# Patient Record
Sex: FEMALE | Race: BLACK OR AFRICAN AMERICAN | NOT HISPANIC OR LATINO | Employment: FULL TIME | URBAN - METROPOLITAN AREA
[De-identification: names, ages, dates, MRNs, and addresses within clinical notes are randomized per-mention and may not be internally consistent; named-entity substitution may affect disease eponyms.]

---

## 2019-02-21 ENCOUNTER — OFFICE VISIT (OUTPATIENT)
Dept: HEMATOLOGY/ONCOLOGY | Facility: CLINIC | Age: 34
End: 2019-02-21
Payer: MEDICAID

## 2019-02-21 VITALS
OXYGEN SATURATION: 98 % | HEART RATE: 85 BPM | SYSTOLIC BLOOD PRESSURE: 121 MMHG | TEMPERATURE: 98 F | HEIGHT: 64 IN | RESPIRATION RATE: 18 BRPM | DIASTOLIC BLOOD PRESSURE: 75 MMHG | WEIGHT: 133.81 LBS | BODY MASS INDEX: 22.85 KG/M2

## 2019-02-21 DIAGNOSIS — Z17.0 MALIGNANT NEOPLASM OF UPPER-OUTER QUADRANT OF LEFT BREAST IN FEMALE, ESTROGEN RECEPTOR POSITIVE: Primary | ICD-10-CM

## 2019-02-21 DIAGNOSIS — C50.412 MALIGNANT NEOPLASM OF UPPER-OUTER QUADRANT OF LEFT BREAST IN FEMALE, ESTROGEN RECEPTOR POSITIVE: Primary | ICD-10-CM

## 2019-02-21 PROBLEM — Z17.1 MALIGNANT NEOPLASM OF UPPER-OUTER QUADRANT OF LEFT BREAST IN FEMALE, ESTROGEN RECEPTOR NEGATIVE: Status: ACTIVE | Noted: 2017-10-26

## 2019-02-21 PROCEDURE — 99213 OFFICE O/P EST LOW 20 MIN: CPT | Mod: S$GLB,,, | Performed by: NURSE PRACTITIONER

## 2019-02-21 PROCEDURE — 99213 PR OFFICE/OUTPT VISIT, EST, LEVL III, 20-29 MIN: ICD-10-PCS | Mod: S$GLB,,, | Performed by: NURSE PRACTITIONER

## 2019-02-21 RX ORDER — NERATINIB 40 MG/1
1 TABLET ORAL DAILY
COMMUNITY
Start: 2018-12-31 | End: 2019-08-06 | Stop reason: SDDI

## 2019-02-21 RX ORDER — TAMOXIFEN CITRATE 20 MG/1
1 TABLET ORAL DAILY
COMMUNITY
Start: 2019-01-08 | End: 2019-12-09 | Stop reason: ALTCHOICE

## 2019-02-21 NOTE — PROGRESS NOTES
Subjective:      Patient ID: Cindy Escobar is a 33 y.o. female.    Oncology History:     Malignant neoplasm of upper-outer quadrant of left breast in female, estrogen receptor positive    10/26/2017 Pathology Significant Finding     Left Breast Bx Invasive Ductal Carcinoma, ER 84%, MN 1.8%, Her2 +   Left Axillary LNBx + met carcinoma          11/13/2017 Genetic Testing     Patient has genetic testing done for Triple Positive Breast Cancer.                                              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         12/14/2017 - 4/18/2018 Chemotherapy     TCHP x 6          7/11/2018 - 12/8/2018 Chemotherapy     Herceptin/Perjeta  q 3 weeks          8/16/2018 -  Hormone Therapy     Tamoxifen          12/13/2018 Imaging Significant Findings     Brett Mammo Cat 2         12/13/2018 Imaging Significant Findings     CT C/A/P:  JAKE          1/3/2019 -  Hormone Therapy     Nerlynx              Chief Complaint: Breast Cancer (pt reports feeling new lump in left breast, lower part)    Cindy Escobar is here for to followup on current antihormonal therapy, tamoxifen and nerlynx. Pt complaint with meds, denies any hot flashes, mood swings, diarrhea or joint aches at this time.  Pt noticed left breast tenderness over weekend. On PE no mass palpable but area of tenderness with cord-like abnormality noted. Pt states breast swelling comes and goes.   Past Medical History:   Diagnosis Date    Anxiety     Breast cancer     Other sleep disorders      Family History   Problem Relation Age of Onset    Hypertension Mother     Hypertension Father     Hypertension Maternal Grandmother      Social History     Socioeconomic History    Marital status:      Spouse name: Not on file    Number of children: Not on file    Years of education: Not on file    Highest education level: Not on file   Social Needs    Financial resource strain: Not on file    Food insecurity -  worry: Not on file    Food insecurity - inability: Not on file    Transportation needs - medical: Not on file    Transportation needs - non-medical: Not on file   Occupational History    Not on file   Tobacco Use    Smoking status: Former Smoker    Smokeless tobacco: Never Used   Substance and Sexual Activity    Alcohol use: No     Frequency: Never    Drug use: No    Sexual activity: Not on file   Other Topics Concern    Not on file   Social History Narrative    Not on file     Past Surgical History:   Procedure Laterality Date    CHOLECYSTECTOMY  2006    PORTACATH PLACEMENT  12/01/2017           Review of systems:  Review of Systems   Constitutional: Negative for activity change, appetite change, chills, diaphoresis, fatigue, fever and unexpected weight change.   HENT: Negative for congestion, dental problem, mouth sores, nosebleeds, sore throat, tinnitus and voice change.    Eyes: Negative for photophobia, discharge and visual disturbance.   Respiratory: Negative for apnea, cough, choking, chest tightness, shortness of breath, wheezing and stridor.    Cardiovascular: Negative for chest pain, palpitations and leg swelling.   Gastrointestinal: Negative for abdominal distention, abdominal pain, anal bleeding, blood in stool, constipation, diarrhea, nausea, rectal pain and vomiting.   Endocrine: Negative for cold intolerance and heat intolerance.   Genitourinary: Negative for difficulty urinating, dysuria, hematuria, menstrual problem, vaginal bleeding, vaginal discharge and vaginal pain.   Musculoskeletal: Negative for arthralgias, back pain, gait problem, joint swelling and myalgias.   Skin: Negative for color change, pallor, rash and wound.   Neurological: Negative for dizziness, syncope, light-headedness and numbness.   Hematological: Negative for adenopathy. Does not bruise/bleed easily.   Psychiatric/Behavioral: Negative for agitation, confusion, sleep disturbance and suicidal ideas. The patient is  not nervous/anxious.        Objective:     Physical Exam   Constitutional: She is oriented to person, place, and time. She appears well-developed and well-nourished.   Neck: Normal range of motion.   Cardiovascular: Normal rate, regular rhythm and normal heart sounds.   Pulmonary/Chest: Effort normal and breath sounds normal. Left breast exhibits tenderness. Left breast exhibits no inverted nipple, no mass, no nipple discharge and no skin change.       Abdominal: Soft. Bowel sounds are normal.   Musculoskeletal: Normal range of motion.   Neurological: She is alert and oriented to person, place, and time.   Psychiatric: She has a normal mood and affect.     Vitals:    02/21/19 0837   BP: 121/75   Pulse: 85   Resp: 18   Temp: 97.8 °F (36.6 °C)   Body surface area is 1.66 meters squared.    Labs:  CBC reviewed     Assessment:      1. Malignant neoplasm of upper-outer quadrant of left breast in female, estrogen receptor positive           Plan:   Malignant neoplasm of upper-outer quadrant of left breast in female, estrogen receptor positive    Other orders  -     US Breast Left Complete; Future; Expected date: 02/21/2019    RTC 1 week with left breast u/s       ALTAGRACIA Hernandez

## 2019-02-26 ENCOUNTER — TELEPHONE (OUTPATIENT)
Dept: HEMATOLOGY/ONCOLOGY | Facility: CLINIC | Age: 34
End: 2019-02-26

## 2019-02-26 NOTE — TELEPHONE ENCOUNTER
Called pt with results of left breast u/s done on 2/22/19 showing a dense area of breast tissue without internal doppler flow near site of prior surgery, may represent resolving hematoma vs dense breast tissue. No mass present.  Rec 6 month repeat.   Will see pt in 1 month for eval

## 2019-03-21 ENCOUNTER — OFFICE VISIT (OUTPATIENT)
Dept: HEMATOLOGY/ONCOLOGY | Facility: CLINIC | Age: 34
End: 2019-03-21
Payer: MEDICAID

## 2019-03-21 VITALS
SYSTOLIC BLOOD PRESSURE: 122 MMHG | HEART RATE: 75 BPM | HEIGHT: 64 IN | BODY MASS INDEX: 22.57 KG/M2 | OXYGEN SATURATION: 98 % | WEIGHT: 132.19 LBS | TEMPERATURE: 97 F | RESPIRATION RATE: 18 BRPM | DIASTOLIC BLOOD PRESSURE: 77 MMHG

## 2019-03-21 DIAGNOSIS — C50.412 MALIGNANT NEOPLASM OF UPPER-OUTER QUADRANT OF LEFT BREAST IN FEMALE, ESTROGEN RECEPTOR POSITIVE: Primary | ICD-10-CM

## 2019-03-21 DIAGNOSIS — Z17.0 MALIGNANT NEOPLASM OF UPPER-OUTER QUADRANT OF LEFT BREAST IN FEMALE, ESTROGEN RECEPTOR POSITIVE: Primary | ICD-10-CM

## 2019-03-21 PROCEDURE — 99214 PR OFFICE/OUTPT VISIT, EST, LEVL IV, 30-39 MIN: ICD-10-PCS | Mod: S$GLB,,, | Performed by: NURSE PRACTITIONER

## 2019-03-21 PROCEDURE — 99214 OFFICE O/P EST MOD 30 MIN: CPT | Mod: S$GLB,,, | Performed by: NURSE PRACTITIONER

## 2019-03-21 RX ORDER — DIPHENOXYLATE HYDROCHLORIDE AND ATROPINE SULFATE 2.5; .025 MG/1; MG/1
1 TABLET ORAL 4 TIMES DAILY PRN
COMMUNITY
End: 2019-08-06 | Stop reason: SDDI

## 2019-03-21 NOTE — PROGRESS NOTES
"Subjective:      Patient ID: Cindy Escobar is a 33 y.o. female.    Oncology History:     Malignant neoplasm of upper-outer quadrant of left breast in female, estrogen receptor positive    10/26/2017 Cancer Staged     Cancer Staging  Malignant neoplasm of upper-outer quadrant of left breast in female, estrogen receptor positive  Staging form: Onc Breast AJCC V7  - Clinical stage from 10/26/2017: Stage IIB (T2, N1, M0) - Unsigned  - Pathologic stage from 5/21/2018: Stage IB (T1a, N1mi, cM0) - Unsigned         11/13/2017 Genetic Testing     Patient has genetic testing done for Triple Positive Breast Cancer.                                              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         12/14/2017 - 4/18/2018 Chemotherapy     TCHP x 6          7/11/2018 - 12/8/2018 Chemotherapy     Herceptin/Perjeta  q 3 weeks          8/16/2018 -  Hormone Therapy     Tamoxifen          12/13/2018 Imaging Significant Findings     Brett Mammo Cat 2         12/13/2018 Imaging Significant Findings     CT C/A/P:  JAKE          12/13/2018 Imaging Significant Findings     CT C/A/P JAKE          1/3/2019 -  Hormone Therapy     Nerlynx              Chief Complaint: Breast Cancer    Cindy Escobar is here for to followup on current antihormonal therapy, tamoxifen and nerlynx. Pt complaint with meds, denies any hot flashes, mood swings, or joint aches at this time.  Pt noticed left breast tenderness over weekend. On PE no mass palpable but area of tenderness with cord-like abnormality noted. Pt states breast swelling comes and goes.     Pt states some diarrhea and upset stomach, so "missed a few dose" this last month. Encouraged medication compliance and to take Lomotil as instructed.    Past Medical History:   Diagnosis Date    Anxiety     Breast cancer     Other sleep disorders      Family History   Problem Relation Age of Onset    Hypertension Mother     Hypertension Father     " Hypertension Maternal Grandmother      Social History     Socioeconomic History    Marital status:      Spouse name: Not on file    Number of children: Not on file    Years of education: Not on file    Highest education level: Not on file   Social Needs    Financial resource strain: Not on file    Food insecurity - worry: Not on file    Food insecurity - inability: Not on file    Transportation needs - medical: Not on file    Transportation needs - non-medical: Not on file   Occupational History    Not on file   Tobacco Use    Smoking status: Former Smoker    Smokeless tobacco: Never Used   Substance and Sexual Activity    Alcohol use: No     Frequency: Never    Drug use: No    Sexual activity: Not on file   Other Topics Concern    Not on file   Social History Narrative    Not on file     Past Surgical History:   Procedure Laterality Date    CHOLECYSTECTOMY  2006    PORTACATH PLACEMENT  12/01/2017           Review of systems:  Review of Systems   Constitutional: Negative for activity change, appetite change, chills, diaphoresis, fatigue, fever and unexpected weight change.   HENT: Negative for congestion, dental problem, mouth sores, nosebleeds, sore throat, tinnitus and voice change.    Eyes: Negative for photophobia, discharge and visual disturbance.   Respiratory: Negative for apnea, cough, choking, chest tightness, shortness of breath, wheezing and stridor.    Cardiovascular: Negative for chest pain, palpitations and leg swelling.   Gastrointestinal: Positive for diarrhea (pt reports having slacked off on Nerlynx due to diarrhea; educated on using Lomotil and importance of taking Nerlynx as prescribed). Negative for abdominal distention, abdominal pain, anal bleeding, blood in stool, constipation, nausea, rectal pain and vomiting.   Endocrine: Negative for cold intolerance and heat intolerance.   Genitourinary: Negative for difficulty urinating, dysuria, hematuria, menstrual problem,  vaginal bleeding, vaginal discharge and vaginal pain.   Musculoskeletal: Negative for arthralgias, back pain, gait problem, joint swelling and myalgias.   Skin: Negative for color change, pallor, rash and wound.   Neurological: Negative for dizziness, syncope, light-headedness and numbness.   Hematological: Negative for adenopathy. Does not bruise/bleed easily.   Psychiatric/Behavioral: Negative for agitation, confusion, sleep disturbance and suicidal ideas. The patient is not nervous/anxious.        Objective:     Physical Exam   Constitutional: She is oriented to person, place, and time. She appears well-developed and well-nourished.   Neck: Normal range of motion.   Cardiovascular: Normal rate, regular rhythm and normal heart sounds.   Pulmonary/Chest: Effort normal and breath sounds normal. Left breast exhibits tenderness. Left breast exhibits no inverted nipple, no mass, no nipple discharge and no skin change.       Abdominal: Soft. Bowel sounds are normal.   Musculoskeletal: Normal range of motion.   Neurological: She is alert and oriented to person, place, and time.   Psychiatric: She has a normal mood and affect.     Vitals:    03/21/19 0915   BP: 122/77   Pulse: 75   Resp: 18   Temp: 97.2 °F (36.2 °C)   Body surface area is 1.65 meters squared.    Labs:  CBC reviewed, PLT now in WNL. WBC 3.8, CMP WNL     Assessment:      1. Malignant neoplasm of upper-outer quadrant of left breast in female, estrogen receptor positive           Plan:   Malignant neoplasm of upper-outer quadrant of left breast in female, estrogen receptor positive  -     CBC w/ DIFF; Future; Expected date: 03/21/2019  -     CMP; Future; Expected date: 03/21/2019    Adv compliance with Nerlynx and Lomotil and to call if diarrhea persists.   Discussed with pt indications and over all benefit of nerlynx.        Monae Ag, ANP

## 2019-03-29 ENCOUNTER — TELEPHONE (OUTPATIENT)
Dept: HEMATOLOGY/ONCOLOGY | Facility: CLINIC | Age: 34
End: 2019-03-29

## 2019-03-29 DIAGNOSIS — N94.10 DYSPAREUNIA, FEMALE: Primary | ICD-10-CM

## 2019-03-29 NOTE — TELEPHONE ENCOUNTER
----- Message from Amparo Law sent at 3/28/2019  1:24 PM CDT -----  Contact: patient  States that she has a UTI.. [phone]..thinks she needs antibi

## 2019-06-21 ENCOUNTER — OFFICE VISIT (OUTPATIENT)
Dept: HEMATOLOGY/ONCOLOGY | Facility: CLINIC | Age: 34
End: 2019-06-21
Payer: MEDICAID

## 2019-06-21 VITALS
HEIGHT: 64 IN | WEIGHT: 138.19 LBS | TEMPERATURE: 99 F | DIASTOLIC BLOOD PRESSURE: 65 MMHG | SYSTOLIC BLOOD PRESSURE: 109 MMHG | HEART RATE: 69 BPM | OXYGEN SATURATION: 99 % | RESPIRATION RATE: 18 BRPM | BODY MASS INDEX: 23.59 KG/M2

## 2019-06-21 DIAGNOSIS — Z17.0 MALIGNANT NEOPLASM OF UPPER-OUTER QUADRANT OF LEFT BREAST IN FEMALE, ESTROGEN RECEPTOR POSITIVE: Primary | ICD-10-CM

## 2019-06-21 DIAGNOSIS — C50.412 MALIGNANT NEOPLASM OF UPPER-OUTER QUADRANT OF LEFT BREAST IN FEMALE, ESTROGEN RECEPTOR POSITIVE: Primary | ICD-10-CM

## 2019-06-21 PROCEDURE — 99214 PR OFFICE/OUTPT VISIT, EST, LEVL IV, 30-39 MIN: ICD-10-PCS | Mod: S$GLB,,, | Performed by: NURSE PRACTITIONER

## 2019-06-21 PROCEDURE — 99214 OFFICE O/P EST MOD 30 MIN: CPT | Mod: S$GLB,,, | Performed by: NURSE PRACTITIONER

## 2019-06-21 NOTE — PROGRESS NOTES
"Subjective:      Patient ID: Cindy Escobar is a 33 y.o. female.    Oncology History:     Malignant neoplasm of upper-outer quadrant of left breast in female, estrogen receptor positive    10/26/2017 Cancer Staged     Cancer Staging  Malignant neoplasm of upper-outer quadrant of left breast in female, estrogen receptor positive  Staging form: Onc Breast AJCC V7  - Clinical stage from 10/26/2017: Stage IIB (T2, N1, M0) - Unsigned  - Pathologic stage from 5/21/2018: Stage IB (T1a, N1mi, cM0) - Unsigned         11/13/2017 Genetic Testing     Patient has genetic testing done for Triple Positive Breast Cancer.                                              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         12/14/2017 - 4/18/2018 Chemotherapy     TCHP x 6          7/11/2018 - 12/8/2018 Chemotherapy     Herceptin/Perjeta  q 3 weeks          8/16/2018 -  Hormone Therapy     Tamoxifen          12/13/2018 Imaging Significant Findings     Brett Mammo Cat 2         12/13/2018 Imaging Significant Findings     CT C/A/P:  JAKE          12/13/2018 Imaging Significant Findings     CT C/A/P JAKE          1/3/2019 - 4/1/2019 Hormone Therapy     Nerlynx              Chief Complaint: Breast Cancer (reports having stopped taking nerlynx)    Cindy Escobar is here for to followup on current antihormonal therapy, tamoxifen and nerlynx. Pt complaint with meds, denies any hot flashes, mood swings, or joint aches at this time.  Pt noticed left breast tenderness over weekend. On PE no mass palpable but area of tenderness with cord-like abnormality noted. Pt states breast swelling comes and goes.     Pt states some diarrhea and upset stomach, so "missed a few dose" this last month. Encouraged medication compliance and to take Lomotil as instructed.    Today, she states she quit Nerlynx in April 2019 due to side effects of diarrhea and upset stomach. Patient is complaint with tamoxifen with no resumption of menstruation. She " is feeling good but does c/o left breast swelling and tenderness that comes and goes. Labs reviewed with patient. Repeat imaging to be ordered today for next visit.     Past Medical History:   Diagnosis Date    Anxiety     Breast cancer     Cancer     Other sleep disorders      Family History   Problem Relation Age of Onset    Hypertension Mother     Hypertension Father     Hypertension Maternal Grandmother      Social History     Socioeconomic History    Marital status:      Spouse name: Not on file    Number of children: Not on file    Years of education: Not on file    Highest education level: Not on file   Occupational History    Not on file   Social Needs    Financial resource strain: Not on file    Food insecurity:     Worry: Not on file     Inability: Not on file    Transportation needs:     Medical: Not on file     Non-medical: Not on file   Tobacco Use    Smoking status: Former Smoker    Smokeless tobacco: Never Used   Substance and Sexual Activity    Alcohol use: No     Frequency: Never    Drug use: No    Sexual activity: Not on file   Lifestyle    Physical activity:     Days per week: Not on file     Minutes per session: Not on file    Stress: Not on file   Relationships    Social connections:     Talks on phone: Not on file     Gets together: Not on file     Attends Jain service: Not on file     Active member of club or organization: Not on file     Attends meetings of clubs or organizations: Not on file     Relationship status: Not on file   Other Topics Concern    Not on file   Social History Narrative    Not on file     Past Surgical History:   Procedure Laterality Date    CHOLECYSTECTOMY  2006    PORTACATH PLACEMENT  12/01/2017           Review of systems:  Review of Systems   Constitutional: Negative for activity change, appetite change, chills, diaphoresis, fatigue, fever and unexpected weight change.   HENT: Negative for congestion, dental problem, mouth  sores, nosebleeds, sore throat, tinnitus and voice change.    Eyes: Negative for photophobia, discharge and visual disturbance.   Respiratory: Negative for apnea, cough, choking, chest tightness, shortness of breath, wheezing and stridor.    Cardiovascular: Negative for chest pain, palpitations and leg swelling.   Gastrointestinal: Positive for diarrhea (pt reports having slacked off on Nerlynx due to diarrhea; educated on using Lomotil and importance of taking Nerlynx as prescribed). Negative for abdominal distention, abdominal pain, anal bleeding, blood in stool, constipation, nausea, rectal pain and vomiting.   Endocrine: Negative for cold intolerance and heat intolerance.   Genitourinary: Negative for difficulty urinating, dysuria, hematuria, menstrual problem, vaginal bleeding, vaginal discharge and vaginal pain.   Musculoskeletal: Negative for arthralgias, back pain, gait problem, joint swelling and myalgias.   Skin: Negative for color change, pallor, rash and wound.   Neurological: Negative for dizziness, syncope, light-headedness and numbness.   Hematological: Negative for adenopathy. Does not bruise/bleed easily.   Psychiatric/Behavioral: Negative for agitation, confusion, sleep disturbance and suicidal ideas. The patient is not nervous/anxious.        Objective:     Physical Exam   Constitutional: She is oriented to person, place, and time. She appears well-developed and well-nourished.   Neck: Normal range of motion.   Cardiovascular: Normal rate, regular rhythm and normal heart sounds.   Pulmonary/Chest: Effort normal and breath sounds normal. Left breast exhibits tenderness. Left breast exhibits no inverted nipple, no mass, no nipple discharge and no skin change.       Abdominal: Soft. Bowel sounds are normal.   Musculoskeletal: Normal range of motion.   Neurological: She is alert and oriented to person, place, and time.   Psychiatric: She has a normal mood and affect.     Vitals:    06/21/19 0953   BP:  109/65   Pulse: 69   Resp: 18   Temp: 98.7 °F (37.1 °C)   Body surface area is 1.68 meters squared.    Labs:  6/19/19 WBC 2.9 HGB 12.6  CMP WNL      Assessment:      1. Malignant neoplasm of upper-outer quadrant of left breast in female, estrogen receptor positive           Plan:   Malignant neoplasm of upper-outer quadrant of left breast in female, estrogen receptor positive  -     CT Chest With Contrast; Future; Expected date: 06/21/2019  -     CT Abdomen Pelvis W Wo Contrast; Future; Expected date: 06/21/2019    1. Pt has stopped taking nerlynx due to intolerability.  2. Adv to cont tamoxifen  3. Repeat imaging ordered today for next visit.   4. Will repeat labs.        Monae Ag, ANP

## 2019-07-29 ENCOUNTER — TELEPHONE (OUTPATIENT)
Dept: HEMATOLOGY/ONCOLOGY | Facility: CLINIC | Age: 34
End: 2019-07-29

## 2019-07-29 NOTE — TELEPHONE ENCOUNTER
----- Message from Amparo Law sent at 7/29/2019  9:09 AM CDT -----  Contact: patient  Called for her scan results... Asking for someone to please call her

## 2019-08-06 ENCOUNTER — OFFICE VISIT (OUTPATIENT)
Dept: HEMATOLOGY/ONCOLOGY | Facility: CLINIC | Age: 34
End: 2019-08-06
Payer: MEDICAID

## 2019-08-06 VITALS
BODY MASS INDEX: 23.56 KG/M2 | OXYGEN SATURATION: 97 % | WEIGHT: 138 LBS | DIASTOLIC BLOOD PRESSURE: 65 MMHG | TEMPERATURE: 98 F | HEART RATE: 84 BPM | SYSTOLIC BLOOD PRESSURE: 116 MMHG | RESPIRATION RATE: 14 BRPM | HEIGHT: 64 IN

## 2019-08-06 DIAGNOSIS — Z79.810 LONG-TERM CURRENT USE OF TAMOXIFEN: ICD-10-CM

## 2019-08-06 DIAGNOSIS — C50.412 MALIGNANT NEOPLASM OF UPPER-OUTER QUADRANT OF LEFT BREAST IN FEMALE, ESTROGEN RECEPTOR POSITIVE: Primary | ICD-10-CM

## 2019-08-06 DIAGNOSIS — Z17.0 MALIGNANT NEOPLASM OF UPPER-OUTER QUADRANT OF LEFT BREAST IN FEMALE, ESTROGEN RECEPTOR POSITIVE: Primary | ICD-10-CM

## 2019-08-06 PROCEDURE — 99214 OFFICE O/P EST MOD 30 MIN: CPT | Mod: S$GLB,,, | Performed by: NURSE PRACTITIONER

## 2019-08-06 PROCEDURE — 99214 PR OFFICE/OUTPT VISIT, EST, LEVL IV, 30-39 MIN: ICD-10-PCS | Mod: S$GLB,,, | Performed by: NURSE PRACTITIONER

## 2019-08-06 NOTE — PROGRESS NOTES
Subjective:      Patient ID: Cindy Escobar is a 34 y.o. female.    Oncology History:     Malignant neoplasm of upper-outer quadrant of left breast in female, estrogen receptor positive    10/26/2017 Cancer Staged     Cancer Staging  Malignant neoplasm of upper-outer quadrant of left breast in female, estrogen receptor positive  Staging form: Onc Breast AJCC V7  - Clinical stage from 10/26/2017: Stage IIB (T2, N1, M0) - Unsigned  - Pathologic stage from 2018: Stage IB (T1a, N1mi, cM0) - Unsigned         2017 Genetic Testing     Patient has genetic testing done for Triple Positive Breast Cancer.                                              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         2017 - 2018 Chemotherapy     TCHP x 6          2018 - 2018 Chemotherapy     Herceptin/Perjeta  q 3 weeks          2018 -  Hormone Therapy     Tamoxifen          2018 Imaging Significant Findings     Brett Mammo Cat 2         2018 Imaging Significant Findings     CT C/A/P:  JAKE          2018 Imaging Significant Findings     CT C/A/P JAKE          1/3/2019 - 2019 Hormone Therapy     Nerlynx              Chief Complaint: No chief complaint on file.    Cindy Escobar is here for to followup on current antihormonal therapy, tamoxifen and nerlynx. Pt complaint with meds, denies any hot flashes, mood swings, or joint aches at this time.  Pt noticed left breast tenderness over weekend. On PE no mass palpable but area of tenderness with cord-like abnormality noted. Pt states breast swelling comes and goes.     Today, she states she quit Nerlynx in 2019 due to side effects of diarrhea and upset stomach. Patient is complaint with tamoxifen with no resumption of menstruation. She is feeling good but does c/o left breast swelling and tenderness that comes and goes. Breast exam WNL. Today we reviewed her recent CT scans.     Imagin19 CT  C/A/P:        Past Medical History:   Diagnosis Date    Anxiety     Breast cancer     Cancer     Other sleep disorders      Family History   Problem Relation Age of Onset    Hypertension Mother     Hypertension Father     Hypertension Maternal Grandmother      Social History     Socioeconomic History    Marital status:      Spouse name: Not on file    Number of children: Not on file    Years of education: Not on file    Highest education level: Not on file   Occupational History    Not on file   Social Needs    Financial resource strain: Not on file    Food insecurity:     Worry: Not on file     Inability: Not on file    Transportation needs:     Medical: Not on file     Non-medical: Not on file   Tobacco Use    Smoking status: Former Smoker    Smokeless tobacco: Never Used   Substance and Sexual Activity    Alcohol use: No     Frequency: Never    Drug use: No    Sexual activity: Not on file   Lifestyle    Physical activity:     Days per week: Not on file     Minutes per session: Not on file    Stress: Not on file   Relationships    Social connections:     Talks on phone: Not on file     Gets together: Not on file     Attends Mu-ism service: Not on file     Active member of club or organization: Not on file     Attends meetings of clubs or organizations: Not on file     Relationship status: Not on file   Other Topics Concern    Not on file   Social History Narrative    Not on file     Past Surgical History:   Procedure Laterality Date    CHOLECYSTECTOMY  2006    PORTACATH PLACEMENT  12/01/2017           Review of systems:  Review of Systems   Constitutional: Negative for activity change, appetite change, chills, diaphoresis, fatigue, fever and unexpected weight change.   HENT: Negative for congestion, dental problem, mouth sores, nosebleeds, sore throat, tinnitus and voice change.    Eyes: Negative for photophobia, discharge and visual disturbance.   Respiratory: Negative for  apnea, cough, choking, chest tightness, shortness of breath, wheezing and stridor.    Cardiovascular: Negative for chest pain, palpitations and leg swelling.   Gastrointestinal: Positive for diarrhea (pt reports having slacked off on Nerlynx due to diarrhea; educated on using Lomotil and importance of taking Nerlynx as prescribed). Negative for abdominal distention, abdominal pain, anal bleeding, blood in stool, constipation, nausea, rectal pain and vomiting.   Endocrine: Negative for cold intolerance and heat intolerance.   Genitourinary: Negative for difficulty urinating, dysuria, hematuria, menstrual problem, vaginal bleeding, vaginal discharge and vaginal pain.   Musculoskeletal: Negative for arthralgias, back pain, gait problem, joint swelling and myalgias.   Skin: Negative for color change, pallor, rash and wound.   Neurological: Negative for dizziness, syncope, light-headedness and numbness.   Hematological: Negative for adenopathy. Does not bruise/bleed easily.   Psychiatric/Behavioral: Negative for agitation, confusion, sleep disturbance and suicidal ideas. The patient is not nervous/anxious.        Objective:     Physical Exam   Constitutional: She is oriented to person, place, and time. She appears well-developed and well-nourished.   Neck: Normal range of motion.   Cardiovascular: Normal rate, regular rhythm and normal heart sounds.   Pulmonary/Chest: Effort normal and breath sounds normal. Left breast exhibits tenderness. Left breast exhibits no inverted nipple, no mass, no nipple discharge and no skin change.       Abdominal: Soft. Bowel sounds are normal.   Musculoskeletal: Normal range of motion.   Neurological: She is alert and oriented to person, place, and time.   Psychiatric: She has a normal mood and affect.     There were no vitals filed for this visit.There is no height or weight on file to calculate BSA.    Labs:  6/19/19 WBC 2.9 HGB 12.6  CMP WNL      Assessment:      1. Malignant  neoplasm of upper-outer quadrant of left breast in female, estrogen receptor positive    2. Long-term current use of tamoxifen           Plan:   Malignant neoplasm of upper-outer quadrant of left breast in female, estrogen receptor positive    Long-term current use of tamoxifen    1. Pt has stopped taking nerlynx due to intolerability.  2. Adv to cont tamoxifen  3. Recent CT scans discussed with pt showing JAKE.  4. Pt denies any resumption of menses.   5. RTC in 3 months with labs.       Monae Ag, ANP

## 2019-08-22 DIAGNOSIS — N91.0 DELAYED MENSES: Primary | ICD-10-CM

## 2019-09-18 DIAGNOSIS — N94.6 MENSES PAINFUL: ICD-10-CM

## 2019-09-23 ENCOUNTER — OFFICE VISIT (OUTPATIENT)
Dept: HEMATOLOGY/ONCOLOGY | Facility: CLINIC | Age: 34
End: 2019-09-23
Payer: MEDICAID

## 2019-09-23 VITALS
RESPIRATION RATE: 14 BRPM | HEART RATE: 78 BPM | BODY MASS INDEX: 21.66 KG/M2 | DIASTOLIC BLOOD PRESSURE: 61 MMHG | TEMPERATURE: 99 F | HEIGHT: 67 IN | WEIGHT: 138 LBS | SYSTOLIC BLOOD PRESSURE: 108 MMHG | OXYGEN SATURATION: 97 %

## 2019-09-23 DIAGNOSIS — N94.6 MENSES PAINFUL: ICD-10-CM

## 2019-09-23 DIAGNOSIS — C50.412 MALIGNANT NEOPLASM OF UPPER-OUTER QUADRANT OF LEFT BREAST IN FEMALE, ESTROGEN RECEPTOR POSITIVE: Primary | ICD-10-CM

## 2019-09-23 DIAGNOSIS — Z17.0 MALIGNANT NEOPLASM OF UPPER-OUTER QUADRANT OF LEFT BREAST IN FEMALE, ESTROGEN RECEPTOR POSITIVE: Primary | ICD-10-CM

## 2019-09-23 DIAGNOSIS — Z79.810 LONG-TERM CURRENT USE OF TAMOXIFEN: ICD-10-CM

## 2019-09-23 DIAGNOSIS — Z79.818 USE OF GOSERELIN ACETATE (ZOLADEX): ICD-10-CM

## 2019-09-23 PROCEDURE — 99215 PR OFFICE/OUTPT VISIT, EST, LEVL V, 40-54 MIN: ICD-10-PCS | Mod: S$GLB,,, | Performed by: NURSE PRACTITIONER

## 2019-09-23 PROCEDURE — 99215 OFFICE O/P EST HI 40 MIN: CPT | Mod: S$GLB,,, | Performed by: NURSE PRACTITIONER

## 2019-09-23 RX ORDER — LIDOCAINE AND PRILOCAINE 25; 25 MG/G; MG/G
CREAM TOPICAL
Qty: 25 G | Refills: 0 | Status: SHIPPED | OUTPATIENT
Start: 2019-09-23 | End: 2020-01-20

## 2019-09-23 RX ORDER — LIDOCAINE AND PRILOCAINE 25; 25 MG/G; MG/G
CREAM TOPICAL
Qty: 30 G | Refills: 0 | Status: CANCELLED | OUTPATIENT
Start: 2019-09-23

## 2019-09-23 NOTE — PROGRESS NOTES
Subjective:      Patient ID: Cindy Escobar is a 34 y.o. female.    Oncology History:     Malignant neoplasm of upper-outer quadrant of left breast in female, estrogen receptor positive    10/26/2017 Cancer Staged     Cancer Staging  Malignant neoplasm of upper-outer quadrant of left breast in female, estrogen receptor positive  Staging form: Onc Breast AJCC V7  - Clinical stage from 10/26/2017: Stage IIB (T2, N1, M0) - Unsigned  - Pathologic stage from 5/21/2018: Stage IB (T1a, N1mi, cM0) - Unsigned      11/13/2017 Genetic Testing     Patient has genetic testing done for Triple Positive Breast Cancer.                                              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      12/14/2017 - 4/18/2018 Chemotherapy     TCHP x 6       7/11/2018 - 12/8/2018 Chemotherapy     Herceptin/Perjeta  q 3 weeks       8/16/2018 -  Hormone Therapy     Tamoxifen       12/13/2018 Imaging Significant Findings     Brett Mammo Cat 2      12/13/2018 Imaging Significant Findings     CT C/A/P:  JAKE       12/13/2018 Imaging Significant Findings     CT C/A/P JAKE       1/3/2019 - 4/1/2019 Hormone Therapy     Nerlynx           Chief Complaint: Breast Cancer    Cindy Escobar is here for to followup on current antihormonal therapy, tamoxifen and nerlynx. Pt complaint with meds, denies any hot flashes, mood swings, or joint aches at this time.  Pt noticed left breast tenderness over weekend. On PE no mass palpable but area of tenderness with cord-like abnormality noted. Pt states breast swelling comes and goes.     Today, she states she quit Nerlynx in April 2019 due to side effects of diarrhea and upset stomach. Patient is complaint with tamoxifen with no resumption of menstruation. She is feeling good but does c/o left breast swelling and tenderness that comes and goes. Breast exam WNL. Today we reviewed her recent CT scans.     9/23/19 Visit:  Pt called stating she has resumed her menstrual cycle for  "the last 2 months and went to her GYN at [facility] who confirmed she was have recurrent menses. Tv/Tp u/s showed juve ovarian cyst, benign appearing. She is here today to discuss starting zoladex monthly along with her Tamoxifen. We discussed evaluation for juve oophrectomy as she states she is "done having kids" with the plan to stop zoladex/tamoxifen after surgery and to start Arimidex in the future. She is agreeable for evaluation, but in the interim she will begin zoladex today.     Imagin19 CT C/A/P:        Past Medical History:   Diagnosis Date    Anxiety     Breast cancer     Cancer     Other sleep disorders      Family History   Problem Relation Age of Onset    Hypertension Mother     Hypertension Father     Hypertension Maternal Grandmother      Social History     Socioeconomic History    Marital status:      Spouse name: Not on file    Number of children: Not on file    Years of education: Not on file    Highest education level: Not on file   Occupational History    Not on file   Social Needs    Financial resource strain: Not on file    Food insecurity:     Worry: Not on file     Inability: Not on file    Transportation needs:     Medical: Not on file     Non-medical: Not on file   Tobacco Use    Smoking status: Former Smoker    Smokeless tobacco: Never Used   Substance and Sexual Activity    Alcohol use: No     Frequency: Never    Drug use: Yes     Types: Marijuana    Sexual activity: Not on file   Lifestyle    Physical activity:     Days per week: Not on file     Minutes per session: Not on file    Stress: Not on file   Relationships    Social connections:     Talks on phone: Not on file     Gets together: Not on file     Attends Episcopalian service: Not on file     Active member of club or organization: Not on file     Attends meetings of clubs or organizations: Not on file     Relationship status: Not on file   Other Topics Concern    Not on file   Social History " Narrative    Not on file     Past Surgical History:   Procedure Laterality Date    CHOLECYSTECTOMY  2006    PORTACATH PLACEMENT  12/01/2017           Review of systems:  Review of Systems   Constitutional: Negative for activity change, appetite change, chills, diaphoresis, fatigue, fever and unexpected weight change.   HENT: Negative for congestion, dental problem, mouth sores, nosebleeds, sore throat, tinnitus and voice change.    Eyes: Negative for photophobia, discharge and visual disturbance.   Respiratory: Negative for apnea, cough, choking, chest tightness, shortness of breath, wheezing and stridor.    Cardiovascular: Negative for chest pain, palpitations and leg swelling.   Gastrointestinal: Negative for abdominal distention, abdominal pain, anal bleeding, blood in stool, constipation, diarrhea (pt reports having slacked off on Nerlynx due to diarrhea; educated on using Lomotil and importance of taking Nerlynx as prescribed), nausea, rectal pain and vomiting.   Endocrine: Negative for cold intolerance and heat intolerance.   Genitourinary: Positive for menstrual problem and vaginal bleeding. Negative for difficulty urinating, dysuria, hematuria, vaginal discharge and vaginal pain.   Musculoskeletal: Negative for arthralgias, back pain, gait problem, joint swelling and myalgias.   Skin: Negative for color change, pallor, rash and wound.   Neurological: Negative for dizziness, syncope, light-headedness and numbness.   Hematological: Negative for adenopathy. Does not bruise/bleed easily.   Psychiatric/Behavioral: Negative for agitation, confusion, sleep disturbance and suicidal ideas. The patient is not nervous/anxious.        Objective:     Physical Exam   Constitutional: She is oriented to person, place, and time. She appears well-developed and well-nourished.   Neck: Normal range of motion.   Cardiovascular: Normal rate, regular rhythm and normal heart sounds.   Pulmonary/Chest: Effort normal and breath  "sounds normal. Left breast exhibits tenderness. Left breast exhibits no inverted nipple, no mass, no nipple discharge and no skin change.       Abdominal: Soft. Bowel sounds are normal.   Musculoskeletal: Normal range of motion.   Neurological: She is alert and oriented to person, place, and time.   Psychiatric: She has a normal mood and affect.     Vitals:    09/23/19 1332   BP: 108/61   Pulse: 78   Resp: 14   Temp: 98.8 °F (37.1 °C)   Body surface area is 1.72 meters squared.    Labs:  6/19/19 WBC 2.9 HGB 12.6  CMP WNL      Assessment:      1. Malignant neoplasm of upper-outer quadrant of left breast in female, estrogen receptor positive    2. Menses painful    3. Long-term current use of tamoxifen    4. Use of goserelin acetate (Zoladex)           Plan:   Malignant neoplasm of upper-outer quadrant of left breast in female, estrogen receptor positive    1. Pt has stopped taking nerlynx due to intolerability.  2. Adv to cont tamoxifen with the resumption of menses noted over the last 2 months we will start Zoladex today and q monthly. Side effects discussed and consent signed.  3. She is agreeable to see GYN to discuss bilateral oophorectomy as she states she is "done having children". If she follows through with surgical removal of ovaries, will stop tamoxifen and zoladex and start Arimidex in the future.   4.. RTC in 1 months with labs.       ALTAGRACIA Hernandez    "

## 2019-10-21 ENCOUNTER — OFFICE VISIT (OUTPATIENT)
Dept: HEMATOLOGY/ONCOLOGY | Facility: CLINIC | Age: 34
End: 2019-10-21
Payer: MEDICAID

## 2019-10-21 VITALS
DIASTOLIC BLOOD PRESSURE: 71 MMHG | SYSTOLIC BLOOD PRESSURE: 107 MMHG | TEMPERATURE: 99 F | OXYGEN SATURATION: 94 % | HEART RATE: 74 BPM | HEIGHT: 64 IN | BODY MASS INDEX: 23.22 KG/M2 | WEIGHT: 136 LBS | RESPIRATION RATE: 12 BRPM

## 2019-10-21 DIAGNOSIS — Z79.810 LONG-TERM CURRENT USE OF TAMOXIFEN: ICD-10-CM

## 2019-10-21 DIAGNOSIS — Z17.0 MALIGNANT NEOPLASM OF UPPER-OUTER QUADRANT OF LEFT BREAST IN FEMALE, ESTROGEN RECEPTOR POSITIVE: Primary | ICD-10-CM

## 2019-10-21 DIAGNOSIS — Z79.818 USE OF GOSERELIN ACETATE (ZOLADEX): ICD-10-CM

## 2019-10-21 DIAGNOSIS — C50.412 MALIGNANT NEOPLASM OF UPPER-OUTER QUADRANT OF LEFT BREAST IN FEMALE, ESTROGEN RECEPTOR POSITIVE: Primary | ICD-10-CM

## 2019-10-21 DIAGNOSIS — N94.6 MENSES PAINFUL: ICD-10-CM

## 2019-10-21 PROCEDURE — 99214 OFFICE O/P EST MOD 30 MIN: CPT | Mod: S$GLB,,, | Performed by: NURSE PRACTITIONER

## 2019-10-21 PROCEDURE — 99214 PR OFFICE/OUTPT VISIT, EST, LEVL IV, 30-39 MIN: ICD-10-PCS | Mod: S$GLB,,, | Performed by: NURSE PRACTITIONER

## 2019-10-21 NOTE — PROGRESS NOTES
Subjective:      Patient ID: Cindy Escobar is a 34 y.o. female.    Oncology History:     Malignant neoplasm of upper-outer quadrant of left breast in female, estrogen receptor positive    10/26/2017 Cancer Staged     Cancer Staging  Malignant neoplasm of upper-outer quadrant of left breast in female, estrogen receptor positive  Staging form: Onc Breast AJCC V7  - Clinical stage from 10/26/2017: Stage IIB (T2, N1, M0) - Unsigned  - Pathologic stage from 5/21/2018: Stage IB (T1a, N1mi, cM0) - Unsigned      11/13/2017 Genetic Testing     Patient has genetic testing done for Triple Positive Breast Cancer.                                              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      12/14/2017 - 4/18/2018 Chemotherapy     TCHP x 6       7/11/2018 - 12/8/2018 Chemotherapy     Herceptin/Perjeta  q 3 weeks       8/16/2018 -  Hormone Therapy     Tamoxifen       12/13/2018 Imaging Significant Findings     Brett Mammo Cat 2      12/13/2018 Imaging Significant Findings     CT C/A/P:  JAKE       12/13/2018 Imaging Significant Findings     CT C/A/P JAKE       1/3/2019 - 4/1/2019 Hormone Therapy     Nerlynx           Chief Complaint: Breast Cancer    Cindy Escobar is here for to followup on current antihormonal therapy, tamoxifen and nerlynx. Pt complaint with meds, denies any hot flashes, mood swings, or joint aches at this time.  Pt noticed left breast tenderness over weekend. On PE no mass palpable but area of tenderness with cord-like abnormality noted. Pt states breast swelling comes and goes.     Today, she states she quit Nerlynx in April 2019 due to side effects of diarrhea and upset stomach. Patient is complaint with tamoxifen with no resumption of menstruation. She is feeling good but does c/o left breast swelling and tenderness that comes and goes. Breast exam WNL. Today we reviewed her recent CT scans.     9/23/19 Visit:  Pt called stating she has resumed her menstrual cycle for  "the last 2 months and went to her GYN at [facility] who confirmed she was have recurrent menses. Tv/Tp u/s showed juve ovarian cyst, benign appearing. She is here today to discuss starting zoladex monthly along with her Tamoxifen. We discussed evaluation for juve oophrectomy as she states she is "done having kids" with the plan to stop zoladex/tamoxifen after surgery and to start Arimidex in the future. She is agreeable for evaluation, but in the interim she will begin zoladex today.     10/21/19 visit:   Today in clinic, she is declining zoladex injection due to pain associated. She has not a her menstral cycle in the last month and is to see Dr Fagan, GYN to discuss surgical intervention on Wednesday. Will defer zoladex and will re-eval in 2 weeks. She is instructed to continue tamoxifen.       Imagin19 CT C/A/P:        Past Medical History:   Diagnosis Date    Anxiety     Breast cancer     Cancer     Other sleep disorders      Family History   Problem Relation Age of Onset    Hypertension Mother     Hypertension Father     Hypertension Maternal Grandmother      Social History     Socioeconomic History    Marital status:      Spouse name: Not on file    Number of children: Not on file    Years of education: Not on file    Highest education level: Not on file   Occupational History    Not on file   Social Needs    Financial resource strain: Not on file    Food insecurity:     Worry: Not on file     Inability: Not on file    Transportation needs:     Medical: Not on file     Non-medical: Not on file   Tobacco Use    Smoking status: Former Smoker    Smokeless tobacco: Never Used   Substance and Sexual Activity    Alcohol use: No     Frequency: Never    Drug use: Yes     Types: Marijuana    Sexual activity: Not on file   Lifestyle    Physical activity:     Days per week: Not on file     Minutes per session: Not on file    Stress: Not on file   Relationships    Social connections:     " Talks on phone: Not on file     Gets together: Not on file     Attends Lutheran service: Not on file     Active member of club or organization: Not on file     Attends meetings of clubs or organizations: Not on file     Relationship status: Not on file   Other Topics Concern    Not on file   Social History Narrative    Not on file     Past Surgical History:   Procedure Laterality Date    CHOLECYSTECTOMY  2006    PORTACATH PLACEMENT  12/01/2017           Review of systems:  Review of Systems   Constitutional: Negative for activity change, appetite change, chills, diaphoresis, fatigue, fever and unexpected weight change.   HENT: Negative for congestion, dental problem, mouth sores, nosebleeds, sore throat, tinnitus and voice change.    Eyes: Negative for photophobia, discharge and visual disturbance.   Respiratory: Negative for apnea, cough, choking, chest tightness, shortness of breath, wheezing and stridor.    Cardiovascular: Negative for chest pain, palpitations and leg swelling.   Gastrointestinal: Negative for abdominal distention, abdominal pain, anal bleeding, blood in stool, constipation, diarrhea (pt reports having slacked off on Nerlynx due to diarrhea; educated on using Lomotil and importance of taking Nerlynx as prescribed), nausea, rectal pain and vomiting.   Endocrine: Negative for cold intolerance and heat intolerance.   Genitourinary: Positive for menstrual problem and vaginal bleeding. Negative for difficulty urinating, dysuria, hematuria, vaginal discharge and vaginal pain.   Musculoskeletal: Negative for arthralgias, back pain, gait problem, joint swelling and myalgias.   Skin: Negative for color change, pallor, rash and wound.   Neurological: Negative for dizziness, syncope, light-headedness and numbness.   Hematological: Negative for adenopathy. Does not bruise/bleed easily.   Psychiatric/Behavioral: Negative for agitation, confusion, sleep disturbance and suicidal ideas. The patient is not  "nervous/anxious.        Objective:     Physical Exam   Constitutional: She is oriented to person, place, and time. She appears well-developed and well-nourished.   Neck: Normal range of motion.   Cardiovascular: Normal rate, regular rhythm and normal heart sounds.   Pulmonary/Chest: Effort normal and breath sounds normal. Left breast exhibits tenderness. Left breast exhibits no inverted nipple, no mass, no nipple discharge and no skin change.       Abdominal: Soft. Bowel sounds are normal.   Musculoskeletal: Normal range of motion.   Neurological: She is alert and oriented to person, place, and time.   Psychiatric: She has a normal mood and affect.     Vitals:    10/21/19 1341   BP: 107/71   Pulse: 74   Resp: 12   Temp: 99 °F (37.2 °C)   Body surface area is 1.67 meters squared.    Labs:  6/19/19 WBC 2.9 HGB 12.6  CMP WNL      Assessment:      1. Malignant neoplasm of upper-outer quadrant of left breast in female, estrogen receptor positive    2. Menses painful    3. Long-term current use of tamoxifen    4. Use of goserelin acetate (Zoladex)           Plan:   There are no diagnoses linked to this encounter.1. Pt has stopped taking nerlynx due to intolerability in the past.   2. Adv to cont tamoxifen with the resumption of menses noted over the last 2 months but no menses reported since starting Zoladex.  3. She is agreeable to see GYN to discuss bilateral oophorectomy as she states she is "done having children". If she follows through with surgical removal of ovaries, will stop tamoxifen and zoladex and start Arimidex in the future. She is to see Dr Fagan Wednesday to schedule.  4.. RTC in 2 weeks.   5. mammo  And CT scans due in 12/19, orders placed today.     Monae Ag ANP    "

## 2019-10-23 ENCOUNTER — OFFICE VISIT (OUTPATIENT)
Dept: OBSTETRICS AND GYNECOLOGY | Facility: CLINIC | Age: 34
End: 2019-10-23
Payer: MEDICAID

## 2019-10-23 VITALS
BODY MASS INDEX: 23.18 KG/M2 | HEIGHT: 64 IN | HEART RATE: 83 BPM | WEIGHT: 135.81 LBS | SYSTOLIC BLOOD PRESSURE: 125 MMHG | DIASTOLIC BLOOD PRESSURE: 82 MMHG

## 2019-10-23 DIAGNOSIS — Z17.0 MALIGNANT NEOPLASM OF UPPER-OUTER QUADRANT OF LEFT BREAST IN FEMALE, ESTROGEN RECEPTOR POSITIVE: Primary | ICD-10-CM

## 2019-10-23 DIAGNOSIS — C50.412 MALIGNANT NEOPLASM OF UPPER-OUTER QUADRANT OF LEFT BREAST IN FEMALE, ESTROGEN RECEPTOR POSITIVE: Primary | ICD-10-CM

## 2019-10-23 DIAGNOSIS — N92.1 MENOMETRORRHAGIA: ICD-10-CM

## 2019-10-23 DIAGNOSIS — N94.6 MENSES PAINFUL: ICD-10-CM

## 2019-10-23 PROCEDURE — 99204 OFFICE O/P NEW MOD 45 MIN: CPT | Mod: 25,S$GLB,, | Performed by: OBSTETRICS & GYNECOLOGY

## 2019-10-23 PROCEDURE — 58100 BIOPSY OF UTERUS LINING: CPT | Mod: S$GLB,,, | Performed by: OBSTETRICS & GYNECOLOGY

## 2019-10-23 PROCEDURE — 99204 PR OFFICE/OUTPT VISIT, NEW, LEVL IV, 45-59 MIN: ICD-10-PCS | Mod: 25,S$GLB,, | Performed by: OBSTETRICS & GYNECOLOGY

## 2019-10-23 PROCEDURE — 58100 PR BIOPSY OF UTERUS LINING: ICD-10-PCS | Mod: S$GLB,,, | Performed by: OBSTETRICS & GYNECOLOGY

## 2019-10-23 NOTE — PROGRESS NOTES
Subjective:       Patient ID: Cindy Escobar is a 34 y.o. female.    Chief Complaint:  Pelvic Pain      History of Present Illness  HPI  Patient has a history of invasive breast cancer.  Her cancer was estrogen positive.  [facility] to have her ovaries [facility]. She is currently on tamoxifen and they want to transition her to another drug.  She has not had vaginal bleeding over 3 years and recently had that.  We talked about that today and told her that we need to do some, sampling of her endometrial lining.  So we will proceed with an endometrial biopsy    She has no other complaints.    GYN & OB History  Patient's last menstrual period was 2019.   Date of Last Pap: No result found    OB History    Para Term  AB Living   5 4 4   1 4   SAB TAB Ectopic Multiple Live Births       1          # Outcome Date GA Lbr Marcus/2nd Weight Sex Delivery Anes PTL Lv   5 Ectopic            4 Term            3 Term            2 Term            1 Term                Review of Systems  Review of Systems   Constitutional: Negative for chills and fever.   Respiratory: Negative for shortness of breath.    Cardiovascular: Negative for chest pain.   Gastrointestinal: Negative for abdominal pain, blood in stool, constipation, diarrhea, nausea, vomiting and reflux.   Genitourinary: Positive for menstrual problem. Negative for dysmenorrhea, dyspareunia, dysuria, hematuria, hot flashes, menorrhagia, pelvic pain, vaginal bleeding, vaginal discharge, postcoital bleeding and vaginal dryness.   Musculoskeletal: Negative for arthralgias and joint swelling.   Integumentary:  Negative for rash and hair changes.   Psychiatric/Behavioral: Negative for depression. The patient is not nervous/anxious.            Objective:    Physical Exam:   Constitutional: She appears well-developed and well-nourished. No distress.    HENT:   Head: Normocephalic and atraumatic.    Eyes: Conjunctivae and EOM are normal.     Neck: No tracheal deviation present. No thyromegaly present.    Cardiovascular: Exam reveals no clubbing, no cyanosis and no edema.     Pulmonary/Chest: Effort normal. No respiratory distress.        Abdominal: Soft. She exhibits no distension and no mass. There is no tenderness. There is no rebound and no guarding. No hernia.     Genitourinary: Rectum normal, vagina normal and uterus normal. Pelvic exam was performed with patient supine. There is no rash, tenderness, lesion or injury on the right labia. There is no rash, tenderness, lesion or injury on the left labia. Cervix is normal. Right adnexum displays no mass, no tenderness and no fullness. Left adnexum displays no mass, no tenderness and no fullness.                Skin: She is not diaphoretic. No cyanosis. Nails show no clubbing.           Procedure note-    Verbal consent obtained for endometrial biopsy.  Speculum was placed. Cervix was cleaned with iodine.  A tenaculum was used to grasp the cervix.  Pipelle was introduced and a sample was obtained and sent to pathology.  There were no complications and this concluded the procedure.    Assessment:        1. Malignant neoplasm of upper-outer quadrant of left breast in female, estrogen receptor positive    2. Menses painful    3. Menometrorrhagia               Plan:      Total laparoscopic hysterectomy and bilateral salpingo-oophorectomy.  I counseled her on the risk and benefits of this procedure. I spoke with her oncologist nurse practitioner.  I answered all of her questions and explained to her alternatives.  She wishes to proceed

## 2019-10-23 NOTE — LETTER
October 23, 2019      Monae Ag NP  [address]  [address], [address]  [place] [address]           [facility] [facility]  [address]  [place] [address]  Phone: [phone]  Fax: [phone]          Patient: Cindy Escobar   MR Number: [id number]   YOB: 1985   Date of Visit: 10/23/2019       Dear Monae Ag:    Thank you for referring Cindy Escobar to me for evaluation. Attached you will find relevant portions of my assessment and plan of care.    If you have questions, please do not hesitate to call me. I look forward to following Cindy Escobar along with you.    Sincerely,    Asad Fagan MD    Enclosure  CC:  No Recipients    If you would like to receive this communication electronically, please contact [email or web address] or [phone] to request more information on [employer] access.    For providers and/or their staff who would like to refer a patient to [facility], please contact us through our one-stop-shop provider referral line, [facility] , at [phone].    If you feel you have received this communication in error or would no longer like to receive these types of communications, please e-mail [email or web address]

## 2019-11-04 LAB
APPEARANCE, UA: CLEAR
BASOPHILS NFR SNV MANUAL: 0.5 % (ref 0–3)
BILIRUB UR QL STRIP: NEGATIVE MG/DL
BUN SERPL-MCNC: 16 MG/DL (ref 7–18)
BUN/CREAT SERPL: 16.66 RATIO (ref 7–18)
CALCIUM SERPL-MCNC: 9.1 MG/DL (ref 8.8–10.5)
CHLORIDE SERPL-SCNC: 104 MMOL/L (ref 100–108)
CO2 SERPL-SCNC: 29 MMOL/L (ref 21–32)
COLOR UR: ABNORMAL
CREAT SERPL-MCNC: 0.96 MG/DL (ref 0.55–1.02)
EOSINOPHIL NFR SNV MANUAL: 2.5 % (ref 1–3)
ERYTHROCYTE [DISTWIDTH] IN BLOOD BY AUTOMATED COUNT: 11.9 % (ref 12.5–18)
GFR ESTIMATION: > 60
GLUCOSE (UA): NORMAL MG/DL
GLUCOSE SERPL-MCNC: 87 MG/DL (ref 70–110)
HCG QUALITATIVE: NEGATIVE
HCT VFR BLD AUTO: 37.3 % (ref 37–47)
HGB BLD-MCNC: 13 G/DL (ref 12–16)
HGB UR QL STRIP: NEGATIVE /UL
KETONES UR QL STRIP: ABNORMAL MG/DL
LEUKOCYTE ESTERASE UR QL STRIP: NEGATIVE /UL
LYMPHOCYTES NFR SNV MANUAL: 25.9 % (ref 25–40)
MANUAL NRBC PER 100 CELLS: 0 %
MCH RBC QN AUTO: 31.4 PG (ref 27–31.2)
MCHC RBC AUTO-ENTMCNC: 34.9 G/DL (ref 31.8–35.4)
MCV RBC AUTO: 90.1 FL (ref 80–97)
MONOCYTES/100 LEUKOCYTES: 9.5 % (ref 1–15)
NEUTROPHILS NFR BLD: 2.66 10*3/UL (ref 1.8–7.7)
NEUTROPHILS NFR SNV MANUAL: 61.4 % (ref 37–80)
NITRITE UR QL STRIP: NEGATIVE
PH UR STRIP: 6.5 PH (ref 5–9)
PLATELETS: 114 10*3/UL (ref 142–424)
POTASSIUM SERPL-SCNC: 4.5 MMOL/L (ref 3.6–5.2)
PROT UR QL STRIP: NEGATIVE MG/DL
RBC # BLD AUTO: 4.14 10*6/UL (ref 4.2–5.4)
SODIUM BLD-SCNC: 140 MMOL/L (ref 135–145)
SP GR UR STRIP: 1.02 (ref 1–1.03)
SPECIMEN COLLECTION METHOD, URINE: ABNORMAL
UROBILINOGEN UR STRIP-ACNC: NORMAL MG/DL
WBC # BLD: 4.3 10*3/UL (ref 4.6–10.2)

## 2019-11-06 ENCOUNTER — OUTSIDE PLACE OF SERVICE (OUTPATIENT)
Dept: OBSTETRICS AND GYNECOLOGY | Facility: CLINIC | Age: 34
End: 2019-11-06
Payer: MEDICAID

## 2019-11-06 ENCOUNTER — OUTSIDE PLACE OF SERVICE (OUTPATIENT)
Dept: OBSTETRICS AND GYNECOLOGY | Facility: CLINIC | Age: 34
End: 2019-11-06

## 2019-11-06 PROCEDURE — 58571 PR LAPAROSCOPY W TOT HYSTERECTUTERUS <=250 GRAM  W TUBE/OVARY: ICD-10-PCS | Mod: ,,, | Performed by: OBSTETRICS & GYNECOLOGY

## 2019-11-06 PROCEDURE — 58571 TLH W/T/O 250 G OR LESS: CPT | Mod: ,,, | Performed by: OBSTETRICS & GYNECOLOGY

## 2019-11-13 ENCOUNTER — OFFICE VISIT (OUTPATIENT)
Dept: OBSTETRICS AND GYNECOLOGY | Facility: CLINIC | Age: 34
End: 2019-11-13
Payer: MEDICAID

## 2019-11-13 VITALS
HEART RATE: 82 BPM | BODY MASS INDEX: 22.31 KG/M2 | WEIGHT: 130 LBS | DIASTOLIC BLOOD PRESSURE: 72 MMHG | SYSTOLIC BLOOD PRESSURE: 106 MMHG

## 2019-11-13 DIAGNOSIS — Z09 POSTOP CHECK: Primary | ICD-10-CM

## 2019-11-13 PROCEDURE — 99024 POSTOP FOLLOW-UP VISIT: CPT | Mod: S$GLB,,, | Performed by: OBSTETRICS & GYNECOLOGY

## 2019-11-13 PROCEDURE — 99024 PR POST-OP FOLLOW-UP VISIT: ICD-10-PCS | Mod: S$GLB,,, | Performed by: OBSTETRICS & GYNECOLOGY

## 2019-11-13 NOTE — PROGRESS NOTES
Subjective:       Patient ID: Cindy Escobar 34 y.o.     Chief Complaint:  No chief complaint on file.      History of Present Illness  Pt here for wound check/staple removal.  Pt doing well with normal postop complaints.        OB History   No data available       Review of Systems  Review of Systems   Constitutional: Negative for activity change, appetite change, chills, diaphoresis and fever.   Eyes: Negative for visual disturbance.   Respiratory: Negative for shortness of breath and wheezing.    Cardiovascular: Negative for chest pain.   Gastrointestinal: Negative for blood in stool, constipation, diarrhea, nausea and vomiting.   Genitourinary: Negative for dysuria, hematuria and menorrhagia.   Neurological: Negative for headaches.   Psychiatric/Behavioral: Negative for depression. The patient is not nervous/anxious.    Breast: Negative for lump, skin changes and tenderness          Objective:    Physical Exam:   Constitutional: She appears well-developed and well-nourished. No distress.    HENT:   Head: Normocephalic and atraumatic.      Cardiovascular: Exam reveals no clubbing and no cyanosis.     Pulmonary/Chest: Effort normal. No respiratory distress.        Abdominal: Soft. She exhibits abdominal incision (clean dry and intact). She exhibits no distension. There is no rebound and no guarding.             Musculoskeletal: Normal range of motion and moves all extremeties.        Skin: Skin is warm and dry. She is not diaphoretic. No cyanosis or erythema. Nails show no clubbing.    Psychiatric: She has a normal mood and affect. Her behavior is normal.          Assessment:     SP tlh bso  Wound check normal          Plan:      Keep wound clean and dry  Pain bleeding fever precautions given  rtc for 6 wk exam

## 2019-12-09 ENCOUNTER — TELEPHONE (OUTPATIENT)
Dept: HEMATOLOGY/ONCOLOGY | Facility: CLINIC | Age: 34
End: 2019-12-09

## 2019-12-09 ENCOUNTER — OFFICE VISIT (OUTPATIENT)
Dept: HEMATOLOGY/ONCOLOGY | Facility: CLINIC | Age: 34
End: 2019-12-09
Payer: MEDICAID

## 2019-12-09 VITALS
WEIGHT: 128.63 LBS | RESPIRATION RATE: 16 BRPM | BODY MASS INDEX: 21.96 KG/M2 | OXYGEN SATURATION: 99 % | HEART RATE: 75 BPM | DIASTOLIC BLOOD PRESSURE: 74 MMHG | SYSTOLIC BLOOD PRESSURE: 114 MMHG | HEIGHT: 64 IN | TEMPERATURE: 99 F

## 2019-12-09 DIAGNOSIS — C50.412 MALIGNANT NEOPLASM OF UPPER-OUTER QUADRANT OF LEFT BREAST IN FEMALE, ESTROGEN RECEPTOR POSITIVE: Primary | ICD-10-CM

## 2019-12-09 DIAGNOSIS — Z90.710 H/O TOTAL HYSTERECTOMY: ICD-10-CM

## 2019-12-09 DIAGNOSIS — Z17.0 MALIGNANT NEOPLASM OF UPPER-OUTER QUADRANT OF LEFT BREAST IN FEMALE, ESTROGEN RECEPTOR POSITIVE: Primary | ICD-10-CM

## 2019-12-09 DIAGNOSIS — Z79.811 LONG TERM (CURRENT) USE OF AROMATASE INHIBITORS: ICD-10-CM

## 2019-12-09 PROCEDURE — 99215 PR OFFICE/OUTPT VISIT, EST, LEVL V, 40-54 MIN: ICD-10-PCS | Mod: S$GLB,,, | Performed by: NURSE PRACTITIONER

## 2019-12-09 PROCEDURE — 99215 OFFICE O/P EST HI 40 MIN: CPT | Mod: S$GLB,,, | Performed by: NURSE PRACTITIONER

## 2019-12-09 RX ORDER — ANASTROZOLE 1 MG/1
1 TABLET ORAL DAILY
Qty: 90 TABLET | Refills: 1 | Status: SHIPPED | OUTPATIENT
Start: 2019-12-09 | End: 2020-12-08

## 2019-12-09 NOTE — PROGRESS NOTES
Subjective:      Patient ID: Cindy Escobar is a 34 y.o. female.    Oncology History:     Malignant neoplasm of upper-outer quadrant of left breast in female, estrogen receptor positive    10/26/2017 Cancer Staged     Cancer Staging  Malignant neoplasm of upper-outer quadrant of left breast in female, estrogen receptor positive  Staging form: Onc Breast AJCC V7  - Clinical stage from 10/26/2017: Stage IIB (T2, N1, M0) - Unsigned  - Pathologic stage from 5/21/2018: Stage IB (T1a, N1mi, cM0) - Unsigned      11/13/2017 Genetic Testing     Patient has genetic testing done for Triple Positive Breast Cancer.                                              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      12/14/2017 - 4/18/2018 Chemotherapy     TCHP x 6       7/11/2018 - 12/8/2018 Chemotherapy     Herceptin/Perjeta  q 3 weeks       8/16/2018 - 12/9/2019 Hormone Therapy     Tamoxifen       12/13/2018 Imaging Significant Findings     Brett Mammo Cat 2      12/13/2018 Imaging Significant Findings     CT C/A/P:  JAKE       12/13/2018 Imaging Significant Findings     CT C/A/P JAKE       1/3/2019 - 4/1/2019 Hormone Therapy     Nerlynx      12/9/2019 -  Hormone Therapy     Arimidex started             Chief Complaint: Breast Cancer and Follow-up    Cindy Escobar is here for to followup on current antihormonal therapy, tamoxifen and nerlynx. Pt complaint with meds, denies any hot flashes, mood swings, or joint aches at this time.  Pt noticed left breast tenderness over weekend. On PE no mass palpable but area of tenderness with cord-like abnormality noted. Pt states breast swelling comes and goes.     Today, she states she quit Nerlynx in April 2019 due to side effects of diarrhea and upset stomach. Patient is complaint with tamoxifen with no resumption of menstruation. She is feeling good but does c/o left breast swelling and tenderness that comes and goes. Breast exam WNL. Today we reviewed her recent CT scans.  "    19 Visit:  Pt called stating she has resumed her menstrual cycle for the last 2 months and went to her GYN at [facility] who confirmed she was have recurrent menses. Tv/Tp u/s showed juve ovarian cyst, benign appearing. She is here today to discuss starting zoladex monthly along with her Tamoxifen. We discussed evaluation for juve oophrectomy as she states she is "done having kids" with the plan to stop zoladex/tamoxifen after surgery and to start Arimidex in the future. She is agreeable for evaluation, but in the interim she will begin zoladex today.     10/21/19 visit:   Today in clinic, she is declining zoladex injection due to pain associated. She has not a her menstral cycle in the last month and is to see Dr Fagan, GYN to discuss surgical intervention on Wednesday. Will defer zoladex and will re-eval in 2 weeks. She is instructed to continue tamoxifen.     19:  Today she follows up after her recent MARQUISE/BSO on 19 with Dr Fagan. Pathology: benign. She is instructed to stop tamoxifen and begin Arimidex. SE profile discussed. She will return in 6 weeks with base line bone density test, mammo and repeat imaging.       Imagin19 CT C/A/P:        Past Medical History:   Diagnosis Date    Anxiety     Breast cancer     Cancer     Other sleep disorders      Family History   Problem Relation Age of Onset    Hypertension Mother     Cancer Mother     Diabetes Mother     Hypertension Father     Hypertension Maternal Grandmother      Social History     Socioeconomic History    Marital status:      Spouse name: Not on file    Number of children: Not on file    Years of education: Not on file    Highest education level: Not on file   Occupational History    Not on file   Social Needs    Financial resource strain: Not on file    Food insecurity:     Worry: Not on file     Inability: Not on file    Transportation needs:     Medical: Not on file     Non-medical: Not on file   Tobacco " Use    Smoking status: Former Smoker    Smokeless tobacco: Never Used   Substance and Sexual Activity    Alcohol use: No     Frequency: Never    Drug use: Yes     Types: Marijuana    Sexual activity: Not on file   Lifestyle    Physical activity:     Days per week: Not on file     Minutes per session: Not on file    Stress: Not on file   Relationships    Social connections:     Talks on phone: Not on file     Gets together: Not on file     Attends Yazidi service: Not on file     Active member of club or organization: Not on file     Attends meetings of clubs or organizations: Not on file     Relationship status: Not on file   Other Topics Concern    Not on file   Social History Narrative    Not on file     Past Surgical History:   Procedure Laterality Date    BREAST LUMPECTOMY      CHOLECYSTECTOMY  2006    GALLBLADDER SURGERY      HYSTERECTOMY      PORTACATH PLACEMENT  12/01/2017           Review of systems:  Review of Systems   Constitutional: Negative for activity change, appetite change, chills, diaphoresis, fatigue, fever and unexpected weight change.   HENT: Negative for congestion, dental problem, mouth sores, nosebleeds, sore throat, tinnitus and voice change.    Eyes: Negative for photophobia, discharge and visual disturbance.   Respiratory: Negative for apnea, cough, choking, chest tightness, shortness of breath, wheezing and stridor.    Cardiovascular: Negative for chest pain, palpitations and leg swelling.   Gastrointestinal: Negative for abdominal distention, abdominal pain, anal bleeding, blood in stool, constipation, diarrhea (pt reports having slacked off on Nerlynx due to diarrhea; educated on using Lomotil and importance of taking Nerlynx as prescribed), nausea, rectal pain and vomiting.   Endocrine: Negative for cold intolerance and heat intolerance.   Genitourinary: Positive for menstrual problem and vaginal bleeding. Negative for difficulty urinating, dysuria, hematuria, vaginal  discharge and vaginal pain.   Musculoskeletal: Negative for arthralgias, back pain, gait problem, joint swelling and myalgias.   Skin: Negative for color change, pallor, rash and wound.   Neurological: Negative for dizziness, syncope, light-headedness and numbness.   Hematological: Negative for adenopathy. Does not bruise/bleed easily.   Psychiatric/Behavioral: Negative for agitation, confusion, sleep disturbance and suicidal ideas. The patient is not nervous/anxious.        Objective:     Physical Exam   Constitutional: She is oriented to person, place, and time. She appears well-developed and well-nourished.   Neck: Normal range of motion.   Cardiovascular: Normal rate, regular rhythm and normal heart sounds.   Pulmonary/Chest: Effort normal and breath sounds normal. Left breast exhibits tenderness. Left breast exhibits no inverted nipple, no mass, no nipple discharge and no skin change.       Abdominal: Soft. Bowel sounds are normal.   Musculoskeletal: Normal range of motion.   Neurological: She is alert and oriented to person, place, and time.   Psychiatric: She has a normal mood and affect.     Vitals:    12/09/19 1401   BP: 114/74   Pulse: 75   Resp: 16   Temp: 99.4 °F (37.4 °C)   Body surface area is 1.62 meters squared.    Labs:       Assessment:      1. Malignant neoplasm of upper-outer quadrant of left breast in female, estrogen receptor positive    2. Long term (current) use of aromatase inhibitors    3. H/O total hysterectomy           Plan:   Malignant neoplasm of upper-outer quadrant of left breast in female, estrogen receptor positive  -     anastrozole (ARIMIDEX) 1 mg Tab; Take 1 tablet (1 mg total) by mouth once daily.  Dispense: 90 tablet; Refill: 1    Long term (current) use of aromatase inhibitors    H/O total hysterectomy    1. Pt had MARQUISE/BSO so will stop tamoxifen and start Arimidex.  2. AI SE profile discussed  3. mammo  And CT scans due in 12/19, orders placed today.   4. Baseline bone  density test.   5. RTC in 6 weeks wih above testing.     Monae Ag, ANP

## 2020-01-20 ENCOUNTER — OFFICE VISIT (OUTPATIENT)
Dept: HEMATOLOGY/ONCOLOGY | Facility: CLINIC | Age: 35
End: 2020-01-20
Payer: MEDICAID

## 2020-01-20 VITALS
HEIGHT: 64 IN | OXYGEN SATURATION: 99 % | BODY MASS INDEX: 22.43 KG/M2 | DIASTOLIC BLOOD PRESSURE: 74 MMHG | WEIGHT: 131.38 LBS | HEART RATE: 81 BPM | TEMPERATURE: 98 F | SYSTOLIC BLOOD PRESSURE: 112 MMHG | RESPIRATION RATE: 10 BRPM

## 2020-01-20 DIAGNOSIS — Z17.0 MALIGNANT NEOPLASM OF UPPER-OUTER QUADRANT OF LEFT BREAST IN FEMALE, ESTROGEN RECEPTOR POSITIVE: ICD-10-CM

## 2020-01-20 DIAGNOSIS — N95.1 HOT FLASHES DUE TO MENOPAUSE: Primary | ICD-10-CM

## 2020-01-20 DIAGNOSIS — C50.412 MALIGNANT NEOPLASM OF UPPER-OUTER QUADRANT OF LEFT BREAST IN FEMALE, ESTROGEN RECEPTOR POSITIVE: ICD-10-CM

## 2020-01-20 PROCEDURE — 99214 OFFICE O/P EST MOD 30 MIN: CPT | Mod: S$GLB,,, | Performed by: NURSE PRACTITIONER

## 2020-01-20 PROCEDURE — 99214 PR OFFICE/OUTPT VISIT, EST, LEVL IV, 30-39 MIN: ICD-10-PCS | Mod: S$GLB,,, | Performed by: NURSE PRACTITIONER

## 2020-01-20 RX ORDER — PAROXETINE HYDROCHLORIDE 20 MG/1
20 TABLET, FILM COATED ORAL DAILY
Qty: 30 TABLET | Refills: 2 | Status: SHIPPED | OUTPATIENT
Start: 2020-01-20 | End: 2020-02-27

## 2020-01-20 NOTE — PROGRESS NOTES
Subjective:      Patient ID: Cindy Escobar is a 34 y.o. female.    Oncology History:     Malignant neoplasm of upper-outer quadrant of left breast in female, estrogen receptor positive    10/26/2017 Cancer Staged     Cancer Staging  Malignant neoplasm of upper-outer quadrant of left breast in female, estrogen receptor positive  Staging form: Onc Breast AJCC V7  - Clinical stage from 10/26/2017: Stage IIB (T2, N1, M0) - Unsigned  - Pathologic stage from 5/21/2018: Stage IB (T1a, N1mi, cM0) - Unsigned      11/13/2017 Genetic Testing     Patient has genetic testing done for Triple Positive Breast Cancer.                                              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      12/14/2017 - 4/18/2018 Chemotherapy     TCHP x 6       7/11/2018 - 12/8/2018 Chemotherapy     Herceptin/Perjeta  q 3 weeks       8/16/2018 - 12/9/2019 Hormone Therapy     Tamoxifen       12/13/2018 Imaging Significant Findings     Brett Mammo Cat 2      12/13/2018 Imaging Significant Findings     CT C/A/P:  JAKE       12/13/2018 Imaging Significant Findings     CT C/A/P JAKE       1/3/2019 - 4/1/2019 Hormone Therapy     Nerlynx      12/9/2019 -  Hormone Therapy     Arimidex started             Chief Complaint: Breast Cancer    Cindy Escobar is here for to followup on current antihormonal therapy, tamoxifen and nerlynx. Pt complaint with meds, denies any hot flashes, mood swings, or joint aches at this time.  Pt noticed left breast tenderness over weekend. On PE no mass palpable but area of tenderness with cord-like abnormality noted. Pt states breast swelling comes and goes.     Today, she states she quit Nerlynx in April 2019 due to side effects of diarrhea and upset stomach. Patient is complaint with tamoxifen with no resumption of menstruation. She is feeling good but does c/o left breast swelling and tenderness that comes and goes. Breast exam WNL. Today we reviewed her recent CT scans.     9/23/19  "Visit:  Pt called stating she has resumed her menstrual cycle for the last 2 months and went to her GYN at [facility] who confirmed she was have recurrent menses. Tv/Tp u/s showed juve ovarian cyst, benign appearing. She is here today to discuss starting zoladex monthly along with her Tamoxifen. We discussed evaluation for juve oophrectomy as she states she is "done having kids" with the plan to stop zoladex/tamoxifen after surgery and to start Arimidex in the future. She is agreeable for evaluation, but in the interim she will begin zoladex today.     10/21/19 visit:   Today in clinic, she is declining zoladex injection due to pain associated. She has not a her menstral cycle in the last month and is to see Dr Fagan, GYN to discuss surgical intervention on Wednesday. Will defer zoladex and will re-eval in 2 weeks. She is instructed to continue tamoxifen.     19:  Today she follows up after her recent MARQUISE/BSO on 19 with Dr Fagan. Pathology: benign. She is instructed to stop tamoxifen and begin Arimidex. SE profile discussed. She will return in 6 weeks with base line bone density test, mammo and repeat imaging.     20visit:  Today she is here to discuss recent CT scans and mammo showing JAKE. She states she has not started AI yet and is having problems with mood swings and hot flashes. Will start low dose paxil 1 week prior to starting AI to help manage SE. Advised importance of AI therapy, pt states she will start it next week as instructed. Bone Density to be rescheduled.     Imaging:                                   RADIOLOGY REPORT        PT NAME: LUZ ELENA DWYER SRINI      [facility]     : 1985 F 34             [address]    ACCT: [id number]                                              [facility] Rec #: [id number]                                        [address]    [facility] [place]             Procedure: CT ABD   PELVIS WO/W     CONTRAST  "   REQUISITION #: [id number]      REPORT #: [id number]           DATE OF EXAM: 19    TIME OF EXAM: 1100       CMS MANDATED QUALITY DATA - CT RADIATION - 436        All CT scans at this facility utilizes dose modulation, iterative     reconstruction, and/or weight-based dosing when appropriate to reduce     radiation dose to as low as reasonably achievable.                CT SCAN OF THE ABDOMEN AND PELVIS            HISTORY: Follow-up left breast cancer, C 50.412        COMPARISON: CT scan 2019        TECHNIQUE: Axial helical scanning obtained through the abdomen and pelvis.      Axial, sagittal and coronal images reconstructed. Imaging performed     following IV and oral contrast administration.  Total of 100 cc Isovue-300     given for the CT chest, abdominal and pelvic exams.        Estimated radiation dose in mSv: 22.69 which is inclusive of the CT chest,     abdominal and pelvic CT scans..        FINDINGS:        Lung bases: Please refer to CT chest report same date        Liver: Normal.        Gallbladder: Surgically absent.        Bile ducts: Normal.        Pancreas: Normal        Spleen: Normal.        Adrenal glands: Normal.        Kidneys: Normal.        Aorta: Normal.        Abdomen: No adenopathy, focal fluid collection, ascites, inflammatory change    or free air identified.        Pelvis: No adenopathy, focal fluid collection, ascites, inflammatory change     or free air identified.        Bowel: Normal.        Hernia: None seen.        Appendix: Not seen.        Bones:Unremarkable.        Additional findings: None.            IMPRESSION:        Negative exam.              RADIOLOGY REPORT        PT NAME: LUZ ELENA DWYER SRINI      [facility]     : 1985 F 34             [address]    ACCT: [id number]                                              [facility] Rec #: [id number]                                        [address]    [facility]             Procedure: CHEST THORAX W CONT    REQUISITION #: [id number]      REPORT #: [id number]           DATE OF EXAM: 12/13/19    TIME OF EXAM: 1045       CMS MANDATED QUALITY DATA - CT RADIATION - 436        All CT scans at this facility use dose modulation, iterative reconstruction     and/or weight-based dosing when appropriate to reduce radiation dose to as     low as reasonably achievable.            CT SCAN OF THE CHEST        HISTORY: Left breast cancer, follow-up, C 50.412        COMPARISON: CT scan July 25, 2019        TECHNIQUE: Axial helical scanning obtained through the chest. Axial,     sagittal and coronal images reconstructed. Imaging performed following IV      contrast administration.100 ml ofIsovue 300..        Estimated radiation dose in mSv:  22.69.        FINDINGS:        Stable patchy infiltrate anterior aspect left upper lobe. No pulmonary     nodule or mass.        No pleural effusion or pleural thickening.        No hilar or mediastinal mass or lymphadenopathy. Central airway structures     patent. No pericardial fluid or thickening.        Postsurgical changes left axilla and posterior breast region. No axillary      lymphadenopathy or mass.        No focal bone lesions.        IMPRESSION:        Stable interval appearance.        No acute abnormality.                                            DICTATING PHYSICIAN:  KIRBY CARNES MD                   Date Dictated: 12/13/19 1029        Signed By:  KIRBY CARNES MD <Electronically signed by KIRBY CARNES MD     in OV>    Date Signed:  12/13/19 1034     CC: PRINCESS PORTER NP ; PRINCESS PORTER NP      ADMITTING PHYSICIAN:                                                                                                     ORDERING PHY: PRINCESS PORTER NP                                                                                                                                                          ATTENDING PHY:  PRINCESS PORTER NP    Patient Status:  REG CLI    Admit Service Date: 19                 [facility] UNKNOWN RAD EAP                                RADIOLOGY REPORT        PT NAME: LUZ ELENA DWYER      [facility]     : 1985 F 34             [address]    ACCT: [id number]                                              [place]    [facility] Rec #: [id number]                                        [address]    Patient Location: [place].[facility]/             Procedure: FABIOLA DX BALDEV W CAD CAROL    REQUISITION #: [id number]      REPORT #: [id number]           DATE OF EXAM: 19    TIME OF EXAM: 1300       CMS MANDATED QUALITY DATA - MAMMOGRAPHY - 225        This [facility] utilizes a reminder system to ensure that all     patients receive reminder letters. This includes reminders for routine    screening mammograms, diagnostic mammograms or other breast imaging studies     or interventions where appropriate. This patient's information will be     entered into the appropriate reminder system.        Diagnostic breast mammogram with tomography        CLINICAL HISTORY: Prior left breast cancer status post lumpectomy.        COMPARISON: 2018.        FINDINGS:        Mammography:        Digital CC and MLO views of the bilateral breasts. Digital tomographic CC      and MLO images are acquired of the bilateral breasts.        The breast parenchyma is heterogeneously dense. Please note that the     increased breast density limits mammographic sensitivity for detection of      breast cancer.        Surgical clips are present within the upper outer quadrant of the left     breast from prior lumpectomy. There is associated architectural distortion.        No concerning mass, architectural distortion, or suspicious     microcalcifications.        Secondary evaluation was also performed with computed automated detection.         IMPRESSION: Benign findings, BI-RADS 2.        RECOMMENDATION:  Continued annual screening per ACR guidelines.        Based on ACR recommendations, your patient's lifetime risk for developing      breast cancer has been discussed with her today. Because your patient's     family history warrants further evaluation, your patient was offered the     [facility] hereditary cancer testing to help determine potential risks. You can     expect results and further communications to come from our Nurse Navigator     within the next 30 days or once the results have been communicated to the      patient. As part of this service, the Nurse Navigator or Medical Director      will be discussing results and management plan with the patient directly.      Please call our Navigator with any questions.        DICTATING PHYSICIAN:  FABIAN CONNELLY MD                   Date Dictated: 12/18/19 1145        Signed By:  FABIAN CONNELLY MD <Electronically signed by FABIAN CONNELLY MD in     OV>    Date Signed:  12/18/19 1156     CC: PRINCESS PORTER NP ; PRINCESS PORTER NP      ADMITTING PHYSICIAN:                                                                                                     ORDERING PHY: PRINCESS PORTER NP                                                                                                                                                          ATTENDING PHY: PRINCESS PORTER NP    Patient Status:  REG CLI    Admit Service Date: 12/18/19 7/25/19 CT C/A/P:        Past Medical History:   Diagnosis Date    Anxiety     Breast cancer     Cancer     Other sleep disorders      Family History   Problem Relation Age of Onset    Hypertension Mother     Cancer Mother     Diabetes Mother     Hypertension Father     Hypertension Maternal Grandmother      Social History     Socioeconomic History    Marital status:      Spouse name: Not on file    Number of children: Not on file    Years of education: Not on file    Highest education level: Not on file   Occupational  History    Not on file   Social Needs    Financial resource strain: Not on file    Food insecurity:     Worry: Not on file     Inability: Not on file    Transportation needs:     Medical: Not on file     Non-medical: Not on file   Tobacco Use    Smoking status: Former Smoker    Smokeless tobacco: Never Used   Substance and Sexual Activity    Alcohol use: No     Frequency: Never    Drug use: Yes     Types: Marijuana    Sexual activity: Not on file   Lifestyle    Physical activity:     Days per week: Not on file     Minutes per session: Not on file    Stress: Not on file   Relationships    Social connections:     Talks on phone: Not on file     Gets together: Not on file     Attends [employer] service: Not on file     Active member of club or organization: Not on file     Attends meetings of clubs or organizations: Not on file     Relationship status: Not on file   Other Topics Concern    Not on file   Social History Narrative    Not on file     Past Surgical History:   Procedure Laterality Date    BREAST LUMPECTOMY      CHOLECYSTECTOMY  2006    GALLBLADDER SURGERY      HYSTERECTOMY      PORTACATH PLACEMENT  12/01/2017           Review of systems:  Review of Systems   Constitutional: Negative for activity change, appetite change, chills, diaphoresis, fatigue, fever and unexpected weight change.   HENT: Negative for congestion, dental problem, mouth sores, nosebleeds, sore throat, tinnitus and voice change.    Eyes: Negative for photophobia, discharge and visual disturbance.   Respiratory: Negative for apnea, cough, choking, chest tightness, shortness of breath, wheezing and stridor.    Cardiovascular: Negative for chest pain, palpitations and leg swelling.   Gastrointestinal: Negative for abdominal distention, abdominal pain, anal bleeding, blood in stool, constipation, diarrhea (pt reports having slacked off on Nerlynx due to diarrhea; educated on using Lomotil and importance of taking Nerlynx as  prescribed), nausea, rectal pain and vomiting.   Endocrine: Negative for cold intolerance and heat intolerance (hot flashes).   Genitourinary: Negative for difficulty urinating, dysuria, hematuria, menstrual problem, vaginal bleeding, vaginal discharge and vaginal pain.   Musculoskeletal: Negative for arthralgias, back pain, gait problem, joint swelling and myalgias.   Skin: Negative for color change, pallor, rash and wound.   Neurological: Negative for dizziness, syncope, light-headedness and numbness.   Hematological: Negative for adenopathy. Does not bruise/bleed easily.   Psychiatric/Behavioral: Negative for agitation, confusion, sleep disturbance and suicidal ideas. The patient is not nervous/anxious.        Objective:     Physical Exam   Constitutional: She is oriented to person, place, and time. She appears well-developed and well-nourished.   Neck: Normal range of motion.   Cardiovascular: Normal rate, regular rhythm and normal heart sounds.   Pulmonary/Chest: Effort normal and breath sounds normal. Left breast exhibits tenderness. Left breast exhibits no inverted nipple, no mass, no nipple discharge and no skin change.       Abdominal: Soft. Bowel sounds are normal.   Musculoskeletal: Normal range of motion.   Neurological: She is alert and oriented to person, place, and time.   Psychiatric: She has a normal mood and affect.     Vitals:    01/20/20 1445   BP: 112/74   Pulse: 81   Resp: 10   Temp: 97.8 °F (36.6 °C)   Body surface area is 1.64 meters squared.    Labs:       Assessment:      1. Hot flashes due to menopause    2. Malignant neoplasm of upper-outer quadrant of left breast in female, estrogen receptor positive           Plan:   Hot flashes due to menopause  -     paroxetine (PAXIL) 20 MG tablet; Take 1 tablet (20 mg total) by mouth once daily.  Dispense: 30 tablet; Refill: 2    1. Pt had MARQUISE/BSO so will stop tamoxifen and start paxil then Arimidex.  2. AI SE profile discussed  3. mammo  And CT  scans discussed today showing JAKE.   4. Baseline bone density test.   5. RTC in 4 weeks with above testing.     Monae Ag, ANP

## 2020-01-23 ENCOUNTER — OFFICE VISIT (OUTPATIENT)
Dept: SURGERY | Facility: CLINIC | Age: 35
End: 2020-01-23
Payer: MEDICAID

## 2020-01-23 DIAGNOSIS — Z17.0 MALIGNANT NEOPLASM OF UPPER-OUTER QUADRANT OF LEFT BREAST IN FEMALE, ESTROGEN RECEPTOR POSITIVE: Primary | ICD-10-CM

## 2020-01-23 DIAGNOSIS — C50.412 MALIGNANT NEOPLASM OF UPPER-OUTER QUADRANT OF LEFT BREAST IN FEMALE, ESTROGEN RECEPTOR POSITIVE: Primary | ICD-10-CM

## 2020-01-23 LAB
BASOPHILS NFR SNV MANUAL: 0.8 % (ref 0–3)
EOSINOPHIL NFR SNV MANUAL: 1.9 % (ref 1–3)
ERYTHROCYTE [DISTWIDTH] IN BLOOD BY AUTOMATED COUNT: 12.5 % (ref 12.5–18)
HCT VFR BLD AUTO: 38.5 % (ref 37–47)
HGB BLD-MCNC: 12.8 G/DL (ref 12–16)
LYMPHOCYTES NFR SNV MANUAL: 24.9 % (ref 25–40)
MANUAL NRBC PER 100 CELLS: 0 %
MCH RBC QN AUTO: 30.8 PG (ref 27–31.2)
MCHC RBC AUTO-ENTMCNC: 33.2 G/DL (ref 31.8–35.4)
MCV RBC AUTO: 92.8 FL (ref 80–97)
MONOCYTES/100 LEUKOCYTES: 6.9 % (ref 1–15)
NEUTROPHILS NFR BLD: 3.41 10*3/UL (ref 1.8–7.7)
NEUTROPHILS NFR SNV MANUAL: 65.1 % (ref 37–80)
PLATELETS: 169 10*3/UL (ref 142–424)
RBC # BLD AUTO: 4.15 10*6/UL (ref 4.2–5.4)
WBC # BLD: 5.2 10*3/UL (ref 4.6–10.2)

## 2020-01-23 PROCEDURE — 99213 OFFICE O/P EST LOW 20 MIN: CPT | Mod: S$GLB,,, | Performed by: SURGERY

## 2020-01-23 PROCEDURE — 99213 PR OFFICE/OUTPT VISIT, EST, LEVL III, 20-29 MIN: ICD-10-PCS | Mod: S$GLB,,, | Performed by: SURGERY

## 2020-01-23 NOTE — PROGRESS NOTES
Subjective:       Patient ID: Cindy Escobar is a 34 y.o. female.    Chief Complaint: Pre-op Exam    HPI:  Cindy is seen today to discuss and schedule venous port removal. She was diagnosed with a node positive triple positive invasive ductal carcinoma left breast in October of 2017.  She underwent neoadjuvant chemotherapy followed by left partial mastectomy and limited axillary dissection, completed a full year of anti HER2 therapy followed by whole breast radiation and is currently on Arimidex.  She had a hysterectomy with oophorectomy a few months ago.  Currently no evidence of disease on imaging studies and she has been cleared for venous port removal by Oncology.    Review of Systems   Respiratory: Negative.    Cardiovascular: Negative.    Gastrointestinal: Negative.    Musculoskeletal: Negative.    Hematological: Negative.        Objective:      Physical Exam   Constitutional: She appears well-developed and well-nourished.   Cardiovascular: Normal rate, regular rhythm and normal heart sounds.   Pulmonary/Chest: Effort normal and breath sounds normal.   Right chest wall venous port site appears uncomplicated.   Abdominal: Soft. Bowel sounds are normal. She exhibits no mass.   Musculoskeletal: Normal range of motion. She exhibits no edema or deformity.       Assessment:       1. Malignant neoplasm of upper-outer quadrant of left breast in female, estrogen receptor positive        Plan:       She will be scheduled for venous port removal to be done Monday 01/27/2020.

## 2020-01-23 NOTE — LETTER
January 23, 2020      Monae Ag NP  [address]  [address], [address]  [facility] [address]           [place] [facility]  [address]  [facility] [address]  Phone: [phone]  Fax: [phone]          Patient: Cindy Escobar   MR Number: [id number]   YOB: 1985   Date of Visit: 1/23/2020       Dear Monae Ag:    Thank you for referring Cindy Escobar to me for evaluation. Attached you will find relevant portions of my assessment and plan of care.    If you have questions, please do not hesitate to call me. I look forward to following Cindy Escobar along with you.    Sincerely,    Delfina Arroyo, LPN    Enclosure  CC:  No Recipients    If you would like to receive this communication electronically, please contact [email or web address] or [phone] to request more information on [employer] Link access.    For providers and/or their staff who would like to refer a patient to [facility], please contact us through our one-stop-shop provider referral line, [facility], at [phone].    If you feel you have received this communication in error or would no longer like to receive these types of communications, please e-mail [email or web address]

## 2020-01-27 ENCOUNTER — OUTSIDE PLACE OF SERVICE (OUTPATIENT)
Dept: ADMINISTRATIVE | Facility: OTHER | Age: 35
End: 2020-01-27
Payer: MEDICAID

## 2020-01-27 PROCEDURE — 36590 PR REMOVAL TUNNELED CV CATH W SUBQ PORT OR PUMP: ICD-10-PCS | Mod: ,,, | Performed by: SURGERY

## 2020-01-27 PROCEDURE — 36590 REMOVAL TUNNELED CV CATH: CPT | Mod: ,,, | Performed by: SURGERY

## 2020-02-27 ENCOUNTER — OFFICE VISIT (OUTPATIENT)
Dept: HEMATOLOGY/ONCOLOGY | Facility: CLINIC | Age: 35
End: 2020-02-27
Payer: MEDICAID

## 2020-02-27 VITALS
SYSTOLIC BLOOD PRESSURE: 117 MMHG | RESPIRATION RATE: 14 BRPM | OXYGEN SATURATION: 96 % | DIASTOLIC BLOOD PRESSURE: 70 MMHG | HEART RATE: 85 BPM | BODY MASS INDEX: 22.5 KG/M2 | HEIGHT: 64 IN | WEIGHT: 131.81 LBS | TEMPERATURE: 99 F

## 2020-02-27 DIAGNOSIS — Z79.811 LONG TERM (CURRENT) USE OF AROMATASE INHIBITORS: ICD-10-CM

## 2020-02-27 DIAGNOSIS — Z17.0 MALIGNANT NEOPLASM OF UPPER-OUTER QUADRANT OF LEFT BREAST IN FEMALE, ESTROGEN RECEPTOR POSITIVE: Primary | ICD-10-CM

## 2020-02-27 DIAGNOSIS — C50.412 MALIGNANT NEOPLASM OF UPPER-OUTER QUADRANT OF LEFT BREAST IN FEMALE, ESTROGEN RECEPTOR POSITIVE: Primary | ICD-10-CM

## 2020-02-27 PROBLEM — Z79.810 LONG-TERM CURRENT USE OF TAMOXIFEN: Status: RESOLVED | Noted: 2019-08-06 | Resolved: 2020-02-27

## 2020-02-27 PROBLEM — Z79.818 USE OF GOSERELIN ACETATE (ZOLADEX): Status: RESOLVED | Noted: 2019-09-23 | Resolved: 2020-02-27

## 2020-02-27 PROCEDURE — 99213 OFFICE O/P EST LOW 20 MIN: CPT | Mod: S$GLB,,, | Performed by: NURSE PRACTITIONER

## 2020-02-27 PROCEDURE — 99213 PR OFFICE/OUTPT VISIT, EST, LEVL III, 20-29 MIN: ICD-10-PCS | Mod: S$GLB,,, | Performed by: NURSE PRACTITIONER

## 2020-02-27 NOTE — PROGRESS NOTES
Subjective:      Patient ID: Cindy Escobar is a 34 y.o. female.    Oncology History:     Malignant neoplasm of upper-outer quadrant of left breast in female, estrogen receptor positive    10/26/2017 Cancer Staged     Cancer Staging  Malignant neoplasm of upper-outer quadrant of left breast in female, estrogen receptor positive  Staging form: Onc Breast AJCC V7  - Clinical stage from 10/26/2017: Stage IIB (T2, N1, M0) - Unsigned  - Pathologic stage from 5/21/2018: Stage IB (T1a, N1mi, cM0) - Unsigned      11/13/2017 Genetic Testing     Patient has genetic testing done for Triple Positive Breast Cancer.                                              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      12/14/2017 - 4/18/2018 Chemotherapy     TCHP x 6       7/11/2018 - 12/8/2018 Chemotherapy     Herceptin/Perjeta  q 3 weeks       8/16/2018 - 12/9/2019 Hormone Therapy     Tamoxifen       12/13/2018 Imaging Significant Findings     Brett Mammo Cat 2      12/13/2018 Imaging Significant Findings     CT C/A/P:  JAKE       12/13/2018 Imaging Significant Findings     CT C/A/P JAKE       1/3/2019 - 4/1/2019 Hormone Therapy     Nerlynx      12/9/2019 -  Hormone Therapy     Arimidex started             Chief Complaint: other    Cindy Escobar is here for to followup on current antihormonal therapy, tamoxifen and nerlynx. Pt complaint with meds, denies any hot flashes, mood swings, or joint aches at this time.  Pt noticed left breast tenderness over weekend. On PE no mass palpable but area of tenderness with cord-like abnormality noted. Pt states breast swelling comes and goes.     Today, she states she quit Nerlynx in April 2019 due to side effects of diarrhea and upset stomach. Patient is complaint with tamoxifen with no resumption of menstruation. She is feeling good but does c/o left breast swelling and tenderness that comes and goes. Breast exam WNL. Today we reviewed her recent CT scans.     9/23/19  "Visit:  Pt called stating she has resumed her menstrual cycle for the last 2 months and went to her GYN at [facility] who confirmed she was have recurrent menses. Tv/Tp u/s showed juve ovarian cyst, benign appearing. She is here today to discuss starting zoladex monthly along with her Tamoxifen. We discussed evaluation for juve oophrectomy as she states she is "done having kids" with the plan to stop zoladex/tamoxifen after surgery and to start Arimidex in the future. She is agreeable for evaluation, but in the interim she will begin zoladex today.     10/21/19 visit:   Today in clinic, she is declining zoladex injection due to pain associated. She has not a her menstral cycle in the last month and is to see Dr Fagan, GYN to discuss surgical intervention on Wednesday. Will defer zoladex and will re-eval in 2 weeks. She is instructed to continue tamoxifen.     19:  Today she follows up after her recent MARQUISE/BSO on 19 with Dr Fagan. Pathology: benign. She is instructed to stop tamoxifen and begin Arimidex. SE profile discussed. She will return in 6 weeks with base line bone density test, mammo and repeat imaging.     20visit:  Today she is here to discuss recent CT scans and mammo showing JAKE. She states she has not started AI yet and is having problems with mood swings and hot flashes. Will start low dose paxil 1 week prior to starting AI to help manage SE. Advised importance of AI therapy, pt states she will start it next week as instructed. Bone Density to be rescheduled.     Imaging:                                   RADIOLOGY REPORT        PT NAME: LUZ ELENA DWYER SRINI      [facility] IMAGING     : 1985 F 34             [address]    ACCT: [id number]                                              [facility] Rec #: [id number]                                        [id number]    [facility]             Procedure: CT ABD   PELVIS WO/W     CONTRAST  "   REQUISITION #: [id number]      REPORT #: [id number]           DATE OF EXAM: 19    TIME OF EXAM: 1100       CMS MANDATED QUALITY DATA - CT RADIATION - 436        All CT scans at this facility utilizes dose modulation, iterative     reconstruction, and/or weight-based dosing when appropriate to reduce     radiation dose to as low as reasonably achievable.                CT SCAN OF THE ABDOMEN AND PELVIS            HISTORY: Follow-up left breast cancer, C 50.412        COMPARISON: CT scan 2019        TECHNIQUE: Axial helical scanning obtained through the abdomen and pelvis.      Axial, sagittal and coronal images reconstructed. Imaging performed     following IV and oral contrast administration.  Total of 100 cc Isovue-300     given for the CT chest, abdominal and pelvic exams.        Estimated radiation dose in mSv: 22.69 which is inclusive of the CT chest,     abdominal and pelvic CT scans..        FINDINGS:        Lung bases: Please refer to CT chest report same date        Liver: Normal.        Gallbladder: Surgically absent.        Bile ducts: Normal.        Pancreas: Normal        Spleen: Normal.        Adrenal glands: Normal.        Kidneys: Normal.        Aorta: Normal.        Abdomen: No adenopathy, focal fluid collection, ascites, inflammatory change    or free air identified.        Pelvis: No adenopathy, focal fluid collection, ascites, inflammatory change     or free air identified.        Bowel: Normal.        Hernia: None seen.        Appendix: Not seen.        Bones:Unremarkable.        Additional findings: None.            IMPRESSION:        Negative exam.              RADIOLOGY REPORT        PT NAME: LUZ ELENA DWYER SRINI      [facility] IMAGING     : 1985 F 34             [address]    ACCT: [id number]                                              [facility] Rec #: [id number]                                        [id number]    [facility]             Procedure: CHEST THORAX W CONT    REQUISITION #: [id number]      REPORT #: [id number]           DATE OF EXAM: 12/13/19    TIME OF EXAM: 1045       CMS MANDATED QUALITY DATA - CT RADIATION - 436        All CT scans at this facility use dose modulation, iterative reconstruction     and/or weight-based dosing when appropriate to reduce radiation dose to as     low as reasonably achievable.            CT SCAN OF THE CHEST        HISTORY: Left breast cancer, follow-up, C 50.412        COMPARISON: CT scan July 25, 2019        TECHNIQUE: Axial helical scanning obtained through the chest. Axial,     sagittal and coronal images reconstructed. Imaging performed following IV      contrast administration.100 ml ofIsovue 300..        Estimated radiation dose in mSv:  22.69.        FINDINGS:        Stable patchy infiltrate anterior aspect left upper lobe. No pulmonary     nodule or mass.        No pleural effusion or pleural thickening.        No hilar or mediastinal mass or lymphadenopathy. Central airway structures     patent. No pericardial fluid or thickening.        Postsurgical changes left axilla and posterior breast region. No axillary      lymphadenopathy or mass.        No focal bone lesions.        IMPRESSION:        Stable interval appearance.        No acute abnormality.                                            DICTATING PHYSICIAN:  KIRBY CARNES MD                   Date Dictated: 12/13/19 1029        Signed By:  KIRBY CARNES MD <Electronically signed by KIRBY CARNES MD     in OV>    Date Signed:  12/13/19 1034     CC: PRINCESS PORTER NP ; PRINCESS PORTER NP      ADMITTING PHYSICIAN:                                                                                                     ORDERING PHY: PRINCESS PORTER NP                                                                                                                                                          ATTENDING PHY:  PRINCESS PORTER NP    Patient Status:  REG CLI    Admit Service Date: 19                 [facility] UNKNOWN RAD EAP                                RADIOLOGY REPORT        PT NAME: LUZ ELENA DWYER      [facility]     : 1985 F 34             [address]    ACCT: [id number]                                              [place]    [facility] Rec #: [id number]                                        [address]    [place] Location[place]/             Procedure: FABIOLA DX BALDEV W CAD CAROL    REQUISITION #: [id number]      REPORT #: [id number]           DATE OF EXAM: 19    TIME OF EXAM: 1300       CMS MANDATED QUALITY DATA - MAMMOGRAPHY - 225        This [facility] utilizes a reminder system to ensure that all     patients receive reminder letters. This includes reminders for routine    screening mammograms, diagnostic mammograms or other breast imaging studies     or interventions where appropriate. This patient's information will be     entered into the appropriate reminder system.        Diagnostic breast mammogram with tomography        CLINICAL HISTORY: Prior left breast cancer status post lumpectomy.        COMPARISON: 2018.        FINDINGS:        Mammography:        Digital CC and MLO views of the bilateral breasts. Digital tomographic CC      and MLO images are acquired of the bilateral breasts.        The breast parenchyma is heterogeneously dense. Please note that the     increased breast density limits mammographic sensitivity for detection of      breast cancer.        Surgical clips are present within the upper outer quadrant of the left     breast from prior lumpectomy. There is associated architectural distortion.        No concerning mass, architectural distortion, or suspicious     microcalcifications.        Secondary evaluation was also performed with computed automated detection.         IMPRESSION: Benign findings, BI-RADS 2.        RECOMMENDATION:  Continued annual screening per ACR guidelines.        Based on ACR recommendations, your patient's lifetime risk for developing      breast cancer has been discussed with her today. Because your patient's     family history warrants further evaluation, your patient was offered the     [facility] hereditary cancer testing to help determine potential risks. You can     expect results and further communications to come from our Nurse Navigator     within the next 30 days or once the results have been communicated to the      patient. As part of this service, the Nurse Navigator or Medical Director      will be discussing results and management plan with the patient directly.      Please call our Navigator with any questions.        DICTATING PHYSICIAN:  FABIAN CONNELLY MD                   Date Dictated: 12/18/19 1145        Signed By:  FABIAN CONNELLY MD <Electronically signed by FABIAN CONNELLY MD in     OV>    Date Signed:  12/18/19 1156     CC: PRINCESS PORTER NP ; PRINCESS PORTER NP      ADMITTING PHYSICIAN:                                                                                                     ORDERING PHY: PRINCESS PORTER NP                                                                                                                                                          ATTENDING PHY: PRINCESS PORTER NP    Patient Status:  REG CLI    Admit Service Date: 12/18/19 7/25/19 CT C/A/P:        Past Medical History:   Diagnosis Date    Anxiety     Breast cancer     Other sleep disorders      Family History   Problem Relation Age of Onset    Hypertension Mother     Cancer Mother     Diabetes Mother     Hypertension Father     Hypertension Maternal Grandmother      Social History     Socioeconomic History    Marital status:      Spouse name: Not on file    Number of children: Not on file    Years of education: Not on file    Highest education level: Not on file   Occupational History    Not on  file   Social Needs    Financial resource strain: Not on file    Food insecurity:     Worry: Not on file     Inability: Not on file    Transportation needs:     Medical: Not on file     Non-medical: Not on file   Tobacco Use    Smoking status: Former Smoker    Smokeless tobacco: Never Used   Substance and Sexual Activity    Alcohol use: No     Frequency: Never    Drug use: Yes     Types: Marijuana    Sexual activity: Not on file   Lifestyle    Physical activity:     Days per week: Not on file     Minutes per session: Not on file    Stress: Not on file   Relationships    Social connections:     Talks on phone: Not on file     Gets together: Not on file     Attends Zoroastrian service: Not on file     Active member of club or organization: Not on file     Attends meetings of clubs or organizations: Not on file     Relationship status: Not on file   Other Topics Concern    Not on file   Social History Narrative    Not on file     Past Surgical History:   Procedure Laterality Date    CHOLECYSTECTOMY  2006    HYSTERECTOMY      MASTECTOMY, PARTIAL Left 05/2018    with SLNB    PORTACATH PLACEMENT  12/01/2017           Review of systems:  Review of Systems   Constitutional: Negative for activity change, appetite change, chills, diaphoresis, fatigue, fever and unexpected weight change.   HENT: Negative for congestion, dental problem, mouth sores, nosebleeds, sore throat, tinnitus and voice change.    Eyes: Negative for photophobia, discharge and visual disturbance.   Respiratory: Negative for apnea, cough, choking, chest tightness, shortness of breath, wheezing and stridor.    Cardiovascular: Negative for chest pain, palpitations and leg swelling.   Gastrointestinal: Negative for abdominal distention, abdominal pain, anal bleeding, blood in stool, constipation, diarrhea (pt reports having slacked off on Nerlynx due to diarrhea; educated on using Lomotil and importance of taking Nerlynx as prescribed), nausea,  rectal pain and vomiting.   Endocrine: Negative for cold intolerance and heat intolerance (hot flashes).   Genitourinary: Negative for difficulty urinating, dysuria, hematuria, menstrual problem, vaginal bleeding, vaginal discharge and vaginal pain.   Musculoskeletal: Negative for arthralgias, back pain, gait problem, joint swelling and myalgias.   Skin: Negative for color change, pallor, rash and wound.   Neurological: Negative for dizziness, syncope, light-headedness and numbness.   Hematological: Negative for adenopathy. Does not bruise/bleed easily.   Psychiatric/Behavioral: Negative for agitation, confusion, sleep disturbance and suicidal ideas. The patient is not nervous/anxious.        Objective:     Physical Exam   Constitutional: She is oriented to person, place, and time. She appears well-developed and well-nourished.   Neck: Normal range of motion.   Cardiovascular: Normal rate, regular rhythm and normal heart sounds.   Pulmonary/Chest: Effort normal and breath sounds normal. Left breast exhibits tenderness. Left breast exhibits no inverted nipple, no mass, no nipple discharge and no skin change.       Abdominal: Soft. Bowel sounds are normal.   Musculoskeletal: Normal range of motion.   Neurological: She is alert and oriented to person, place, and time.   Psychiatric: She has a normal mood and affect.     Vitals:    02/27/20 1431   BP: 117/70   Pulse: 85   Resp: 14   Temp: 99 °F (37.2 °C)   Body surface area is 1.64 meters squared.    Labs:       Assessment:      1. Malignant neoplasm of upper-outer quadrant of left breast in female, estrogen receptor positive    2. Long term (current) use of aromatase inhibitors     3. juve wirst pain, likely carpel tunnel syndrome OTC intervention discussed      Plan:   There are no diagnoses linked to this encounter.1. Pt had MARQUISE/BSO so will stopped tamoxifen and started Arimidex.  2. AI SE profile discussed, pt kaleb better  3. mammo  And CT scans discussed today  showing JAKE.   4. Baseline bone density test. Pt needs to r/s,  5. RTC in 3 months with above testing.     Monae Ag, ANP

## 2020-06-03 ENCOUNTER — OFFICE VISIT (OUTPATIENT)
Dept: HEMATOLOGY/ONCOLOGY | Facility: CLINIC | Age: 35
End: 2020-06-03
Payer: MEDICAID

## 2020-06-03 VITALS
BODY MASS INDEX: 23.18 KG/M2 | HEART RATE: 80 BPM | RESPIRATION RATE: 18 BRPM | DIASTOLIC BLOOD PRESSURE: 62 MMHG | HEIGHT: 64 IN | TEMPERATURE: 99 F | OXYGEN SATURATION: 96 % | WEIGHT: 135.81 LBS | SYSTOLIC BLOOD PRESSURE: 99 MMHG

## 2020-06-03 DIAGNOSIS — Z79.811 LONG TERM (CURRENT) USE OF AROMATASE INHIBITORS: Primary | ICD-10-CM

## 2020-06-03 DIAGNOSIS — Z90.710 H/O TOTAL HYSTERECTOMY: ICD-10-CM

## 2020-06-03 DIAGNOSIS — Z17.0 MALIGNANT NEOPLASM OF UPPER-OUTER QUADRANT OF LEFT BREAST IN FEMALE, ESTROGEN RECEPTOR POSITIVE: ICD-10-CM

## 2020-06-03 DIAGNOSIS — C50.412 MALIGNANT NEOPLASM OF UPPER-OUTER QUADRANT OF LEFT BREAST IN FEMALE, ESTROGEN RECEPTOR POSITIVE: ICD-10-CM

## 2020-06-03 LAB
ALBUMIN SERPL BCP-MCNC: 4.3 G/DL (ref 3.4–5)
ALBUMIN/GLOBULIN RATIO: 1.16 RATIO (ref 1.1–1.8)
ALP SERPL-CCNC: 79 U/L (ref 46–116)
ALT SERPL W P-5'-P-CCNC: 23 U/L (ref 12–78)
ANION GAP SERPL CALC-SCNC: 6 MMOL/L (ref 3–11)
AST SERPL-CCNC: 21 U/L (ref 15–37)
BASOPHILS NFR SNV MANUAL: 0.5 % (ref 0–3)
BILIRUB SERPL-MCNC: 0.6 MG/DL (ref 0–1)
BUN SERPL-MCNC: 16 MG/DL (ref 7–18)
BUN/CREAT SERPL: 14.67 RATIO (ref 7–18)
CALCIUM SERPL-MCNC: 9.1 MG/DL (ref 8.8–10.5)
CHLORIDE SERPL-SCNC: 105 MMOL/L (ref 100–108)
CO2 SERPL-SCNC: 30 MMOL/L (ref 21–32)
CREAT SERPL-MCNC: 1.09 MG/DL (ref 0.55–1.02)
EOSINOPHIL NFR SNV MANUAL: 2.1 % (ref 1–3)
ERYTHROCYTE [DISTWIDTH] IN BLOOD BY AUTOMATED COUNT: 12 % (ref 12.5–18)
GFR ESTIMATION: > 60
GLOBULIN: 3.7 G/DL (ref 2.3–3.5)
GLUCOSE SERPL-MCNC: 89 MG/DL (ref 70–110)
HCT VFR BLD AUTO: 39.7 % (ref 37–47)
HGB BLD-MCNC: 13.6 G/DL (ref 12–16)
LYMPHOCYTES NFR SNV MANUAL: 19.9 % (ref 25–40)
MANUAL NRBC PER 100 CELLS: 0 %
MCH RBC QN AUTO: 31.3 PG (ref 27–31.2)
MCHC RBC AUTO-ENTMCNC: 34.3 G/DL (ref 31.8–35.4)
MCV RBC AUTO: 91.5 FL (ref 80–97)
MONOCYTES/100 LEUKOCYTES: 5.4 % (ref 1–15)
NEUTROPHILS NFR BLD: 2.78 10*3/UL (ref 1.8–7.7)
NEUTROPHILS NFR SNV MANUAL: 71.8 % (ref 37–80)
PLATELETS: 196 10*3/UL (ref 142–424)
POTASSIUM SERPL-SCNC: 4.3 MMOL/L (ref 3.6–5.2)
PROT SERPL-MCNC: 8 G/DL (ref 6.4–8.2)
RBC # BLD AUTO: 4.34 10*6/UL (ref 4.2–5.4)
SODIUM BLD-SCNC: 141 MMOL/L (ref 135–145)
WBC # BLD: 3.9 10*3/UL (ref 4.6–10.2)

## 2020-06-03 PROCEDURE — 99214 PR OFFICE/OUTPT VISIT, EST, LEVL IV, 30-39 MIN: ICD-10-PCS | Mod: S$GLB,,, | Performed by: NURSE PRACTITIONER

## 2020-06-03 PROCEDURE — 99214 OFFICE O/P EST MOD 30 MIN: CPT | Mod: S$GLB,,, | Performed by: NURSE PRACTITIONER

## 2020-06-03 NOTE — PROGRESS NOTES
Subjective:      Patient ID: Cindy Escobar is a 34 y.o. female.    Oncology History:     Malignant neoplasm of upper-outer quadrant of left breast in female, estrogen receptor positive    10/26/2017 Cancer Staged     Cancer Staging  Malignant neoplasm of upper-outer quadrant of left breast in female, estrogen receptor positive  Staging form: Onc Breast AJCC V7  - Clinical stage from 10/26/2017: Stage IIB (T2, N1, M0) - Unsigned  - Pathologic stage from 5/21/2018: Stage IB (T1a, N1mi, cM0) - Unsigned      11/13/2017 Genetic Testing     Patient has genetic testing done for Triple Positive Breast Cancer.                                              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      12/14/2017 - 4/18/2018 Chemotherapy     TCHP x 6       7/11/2018 - 12/8/2018 Chemotherapy     Herceptin/Perjeta  q 3 weeks       8/16/2018 - 12/9/2019 Hormone Therapy     Tamoxifen       12/13/2018 Imaging Significant Findings     Brett Mammo Cat 2      12/13/2018 Imaging Significant Findings     CT C/A/P:  JAKE       12/13/2018 Imaging Significant Findings     CT C/A/P JAKE       1/3/2019 - 4/1/2019 Hormone Therapy     Nerlynx      12/9/2019 -  Hormone Therapy     Arimidex started             Chief Complaint: No chief complaint on file.    Cindy Escobar is here for to followup on current antihormonal therapy, tamoxifen and nerlynx. Pt complaint with meds, denies any hot flashes, mood swings, or joint aches at this time.  Pt noticed left breast tenderness over weekend. On PE no mass palpable but area of tenderness with cord-like abnormality noted. Pt states breast swelling comes and goes.     Today, she states she quit Nerlynx in April 2019 due to side effects of diarrhea and upset stomach. Patient is complaint with tamoxifen with no resumption of menstruation. She is feeling good but does c/o left breast swelling and tenderness that comes and goes. Breast exam WNL. Today we reviewed her recent CT scans.  "    19 Visit:  Pt called stating she has resumed her menstrual cycle for the last 2 months and went to her GYN at [facility] who confirmed she was have recurrent menses. Tv/Tp u/s showed juve ovarian cyst, benign appearing. She is here today to discuss starting zoladex monthly along with her Tamoxifen. We discussed evaluation for juve oophrectomy as she states she is "done having kids" with the plan to stop zoladex/tamoxifen after surgery and to start Arimidex in the future. She is agreeable for evaluation, but in the interim she will begin zoladex today.     10/21/19 visit:   Today in clinic, she is declining zoladex injection due to pain associated. She has not a her menstral cycle in the last month and is to see Dr Fagan, GYN to discuss surgical intervention on Wednesday. Will defer zoladex and will re-eval in 2 weeks. She is instructed to continue tamoxifen.     19:  Today she follows up after her recent MARQUISE/BSO on 19 with Dr Fagan. Pathology: benign. She is instructed to stop tamoxifen and begin Arimidex. SE profile discussed. She will return in 6 weeks with base line bone density test, mammo and repeat imaging.     20visit:  Today she is here to discuss recent CT scans and mammo showing JAKE. She states she has not started AI yet and is having problems with mood swings and hot flashes. Will start low dose paxil 1 week prior to starting AI to help manage SE. Advised importance of AI therapy, pt states she will start it next week as instructed. Bone Density to be rescheduled.     6/3/2020:  Pt is feeling great today, compliant with arimidex and no longer taking any other medications. She is not having any side effects from the AI currently.     Imaging:                                   RADIOLOGY REPORT        PT NAME: LUZ ELENA DWYER SRINI      [facility] IMAGING     : 1985 F 34             [address]    ACCT: [id number]                                              [place]    [facility] Rec #: [id number]                                        [id number]    [facility]             Procedure: CT ABD   PELVIS WO/W     CONTRAST    REQUISITION #: [id number]      REPORT #: [id number]           DATE OF EXAM: 19    TIME OF EXAM: 1100       CMS MANDATED QUALITY DATA - CT RADIATION - 436        All CT scans at this facility utilizes dose modulation, iterative     reconstruction, and/or weight-based dosing when appropriate to reduce     radiation dose to as low as reasonably achievable.                CT SCAN OF THE ABDOMEN AND PELVIS            HISTORY: Follow-up left breast cancer, C 50.412        COMPARISON: CT scan 2019        TECHNIQUE: Axial helical scanning obtained through the abdomen and pelvis.      Axial, sagittal and coronal images reconstructed. Imaging performed     following IV and oral contrast administration.  Total of 100 cc Isovue-300     given for the CT chest, abdominal and pelvic exams.        Estimated radiation dose in mSv: 22.69 which is inclusive of the CT chest,     abdominal and pelvic CT scans..        FINDINGS:        Lung bases: Please refer to CT chest report same date        Liver: Normal.        Gallbladder: Surgically absent.        Bile ducts: Normal.        Pancreas: Normal        Spleen: Normal.        Adrenal glands: Normal.        Kidneys: Normal.        Aorta: Normal.        Abdomen: No adenopathy, focal fluid collection, ascites, inflammatory change    or free air identified.        Pelvis: No adenopathy, focal fluid collection, ascites, inflammatory change     or free air identified.        Bowel: Normal.        Hernia: None seen.        Appendix: Not seen.        Bones:Unremarkable.        Additional findings: None.            IMPRESSION:        Negative exam.              RADIOLOGY REPORT        PT NAME: LUZ ELENA DWYER SRINI      [facility] IMAGING     : 1985 F 34             [address]    ACCT: [id number]                                              [facility] Rec #: [id number]                                        [id number]    [facility]             Procedure: CHEST THORAX W CONT    REQUISITION #: [id number]      REPORT #: [id number]           DATE OF EXAM: 12/13/19    TIME OF EXAM: 1045       CMS MANDATED QUALITY DATA - CT RADIATION - 436        All CT scans at this facility use dose modulation, iterative reconstruction     and/or weight-based dosing when appropriate to reduce radiation dose to as     low as reasonably achievable.            CT SCAN OF THE CHEST        HISTORY: Left breast cancer, follow-up, C 50.412        COMPARISON: CT scan July 25, 2019        TECHNIQUE: Axial helical scanning obtained through the chest. Axial,     sagittal and coronal images reconstructed. Imaging performed following IV      contrast administration.100 ml ofIsovue 300..        Estimated radiation dose in mSv:  22.69.        FINDINGS:        Stable patchy infiltrate anterior aspect left upper lobe. No pulmonary     nodule or mass.        No pleural effusion or pleural thickening.        No hilar or mediastinal mass or lymphadenopathy. Central airway structures     patent. No pericardial fluid or thickening.        Postsurgical changes left axilla and posterior breast region. No axillary      lymphadenopathy or mass.        No focal bone lesions.        IMPRESSION:        Stable interval appearance.        No acute abnormality.                                            DICTATING PHYSICIAN:  KIRBY CARNES MD                   Date Dictated: 12/13/19 1029        Signed By:  KIRBY CARNES MD <Electronically signed by KIRBY CARNES MD     in OV>    Date Signed:  12/13/19 1034     CC: PRINCESS PORTER NP ; PRINCESS PORTER NP      ADMITTING PHYSICIAN:                                                                                                     ORDERING PHY: PRINCESS PORTER  KITTY JAMISON                                                                                                                                                          ATTENDING PHY: PRINCESS PORTER NP    Patient Status:  REG CLI    Admit Service Date: 19                 [facility] UNKNOWN RAD EAP                                RADIOLOGY REPORT        PT NAME: LUZ ELENA DWYER      [facility]     : 1985 F 34             [address]    ACCT: [id number]                                              [facility] Rec #: [id number]                                        [id number]    [place] Location[place].[facility]/             Procedure: FABIOLA DX BALDEV W CAD CAROL    REQUISITION #: [id number]      REPORT #: [id number]           DATE OF EXAM: 19    TIME OF EXAM: 1300       CMS MANDATED QUALITY DATA - MAMMOGRAPHY - 225        This [facility] utilizes a reminder system to ensure that all     patients receive reminder letters. This includes reminders for routine    screening mammograms, diagnostic mammograms or other breast imaging studies     or interventions where appropriate. This patient's information will be     entered into the appropriate reminder system.        Diagnostic breast mammogram with tomography        CLINICAL HISTORY: Prior left breast cancer status post lumpectomy.        COMPARISON: 2018.        FINDINGS:        Mammography:        Digital CC and MLO views of the bilateral breasts. Digital tomographic CC      and MLO images are acquired of the bilateral breasts.        The breast parenchyma is heterogeneously dense. Please note that the     increased breast density limits mammographic sensitivity for detection of      breast cancer.        Surgical clips are present within the upper outer quadrant of the left     breast from prior lumpectomy. There is associated architectural distortion.        No concerning mass, architectural distortion, or suspicious      microcalcifications.        Secondary evaluation was also performed with computed automated detection.         IMPRESSION: Benign findings, BI-RADS 2.        RECOMMENDATION: Continued annual screening per ACR guidelines.        Based on ACR recommendations, your patient's lifetime risk for developing      breast cancer has been discussed with her today. Because your patient's     family history warrants further evaluation, your patient was offered the     [facility] hereditary cancer testing to help determine potential risks. You can     expect results and further communications to come from our Nurse Navigator     within the next 30 days or once the results have been communicated to the      patient. As part of this service, the Nurse Navigator or Medical Director      will be discussing results and management plan with the patient directly.      Please call our Navigator with any questions.        DICTATING PHYSICIAN:  FABIAN CONNELLY MD                   Date Dictated: 12/18/19 1145        Signed By:  FABIAN CONNELLY MD <Electronically signed by FABIAN CONNELLY MD in     OV>    Date Signed:  12/18/19 1156     CC: PRINCESS PORTER NP ; PRINCESS PORTER NP      ADMITTING PHYSICIAN:                                                                                                     ORDERING PHY: PRINCESS PORTER NP                                                                                                                                                          ATTENDING PHY: PRINCESS PORTER NP    Patient Status:  REG CLI    Admit Service Date: 12/18/19 7/25/19 CT C/A/P:        Past Medical History:   Diagnosis Date    Anxiety     Breast cancer     Other sleep disorders      Family History   Problem Relation Age of Onset    Hypertension Mother     Cancer Mother     Diabetes Mother     Hypertension Father     Hypertension Maternal Grandmother      Social History     Socioeconomic History    Marital status:       Spouse name: Not on file    Number of children: Not on file    Years of education: Not on file    Highest education level: Not on file   Occupational History    Not on file   Social Needs    Financial resource strain: Not on file    Food insecurity:     Worry: Not on file     Inability: Not on file    Transportation needs:     Medical: Not on file     Non-medical: Not on file   Tobacco Use    Smoking status: Former Smoker    Smokeless tobacco: Never Used   Substance and Sexual Activity    Alcohol use: No     Frequency: Never    Drug use: Yes     Types: Marijuana    Sexual activity: Not on file   Lifestyle    Physical activity:     Days per week: Not on file     Minutes per session: Not on file    Stress: Not on file   Relationships    Social connections:     Talks on phone: Not on file     Gets together: Not on file     Attends [employer] service: Not on file     Active member of club or organization: Not on file     Attends meetings of clubs or organizations: Not on file     Relationship status: Not on file   Other Topics Concern    Not on file   Social History Narrative    Not on file     Past Surgical History:   Procedure Laterality Date    CHOLECYSTECTOMY  2006    HYSTERECTOMY      MASTECTOMY, PARTIAL Left 05/2018    with SLNB    PORTACATH PLACEMENT  12/01/2017           Review of systems:  Review of Systems   Constitutional: Negative for activity change, appetite change, chills, diaphoresis, fatigue, fever and unexpected weight change.   HENT: Negative for congestion, dental problem, mouth sores, nosebleeds, sore throat, tinnitus and voice change.    Eyes: Negative for photophobia, discharge and visual disturbance.   Respiratory: Negative for apnea, cough, choking, chest tightness, shortness of breath, wheezing and stridor.    Cardiovascular: Negative for chest pain, palpitations and leg swelling.   Gastrointestinal: Negative for abdominal distention, abdominal pain, anal bleeding, blood  in stool, constipation, diarrhea (pt reports having slacked off on Nerlynx due to diarrhea; educated on using Lomotil and importance of taking Nerlynx as prescribed), nausea, rectal pain and vomiting.   Endocrine: Negative for cold intolerance and heat intolerance (hot flashes).   Genitourinary: Negative for difficulty urinating, dysuria, hematuria, menstrual problem, vaginal bleeding, vaginal discharge and vaginal pain.   Musculoskeletal: Negative for arthralgias, back pain, gait problem, joint swelling and myalgias.   Skin: Negative for color change, pallor, rash and wound.   Neurological: Negative for dizziness, syncope, light-headedness and numbness.   Hematological: Negative for adenopathy. Does not bruise/bleed easily.   Psychiatric/Behavioral: Negative for agitation, confusion, sleep disturbance and suicidal ideas. The patient is not nervous/anxious.        Objective:     Physical Exam   Constitutional: She is oriented to person, place, and time. She appears well-developed and well-nourished.   Neck: Normal range of motion.   Cardiovascular: Normal rate, regular rhythm and normal heart sounds.   Pulmonary/Chest: Effort normal and breath sounds normal. Left breast exhibits tenderness. Left breast exhibits no inverted nipple, no mass, no nipple discharge and no skin change.       Abdominal: Soft. Bowel sounds are normal.   Musculoskeletal: Normal range of motion.   Neurological: She is alert and oriented to person, place, and time.   Psychiatric: She has a normal mood and affect.     There were no vitals filed for this visit.There is no height or weight on file to calculate BSA.    Labs:     Lab Results   Component Value Date    WBC 3.9 (L) 06/03/2020    HGB 13.6 06/03/2020    HCT 39.7 06/03/2020    LABPLAT 196 06/03/2020       CMP  Sodium   Date Value Ref Range Status   06/03/2020 141 135 - 145 mmol/L Final     Potassium   Date Value Ref Range Status   06/03/2020 4.3 3.6 - 5.2 mmol/L Final     Chloride   Date  Value Ref Range Status   06/03/2020 105 100 - 108 mmol/L Final     CO2   Date Value Ref Range Status   06/03/2020 30 21 - 32 mmol/L Final     Glucose   Date Value Ref Range Status   06/03/2020 89 70 - 110 mg/dL Final     BUN, Bld   Date Value Ref Range Status   06/03/2020 16 7 - 18 mg/dL Final     Creatinine   Date Value Ref Range Status   06/03/2020 1.09 (H) 0.55 - 1.02 mg/dL Final     Calcium   Date Value Ref Range Status   06/03/2020 9.1 8.8 - 10.5 mg/dL Final     Total Protein   Date Value Ref Range Status   06/03/2020 8.0 6.4 - 8.2 g/dL Final     Albumin   Date Value Ref Range Status   06/03/2020 4.3 3.4 - 5.0 g/dL Final     Total Bilirubin   Date Value Ref Range Status   06/03/2020 0.6 0.0 - 1.0 mg/dL Final     Alkaline Phosphatase   Date Value Ref Range Status   06/03/2020 79 46 - 116 U/L Final     AST   Date Value Ref Range Status   06/03/2020 21 15 - 37 U/L Final     ALT   Date Value Ref Range Status   06/03/2020 23 12 - 78 U/L Final     Anion Gap   Date Value Ref Range Status   06/03/2020 6.0 3.0 - 11.0 mmol/L Final         Assessment:      1. Long term (current) use of aromatase inhibitors    2. Malignant neoplasm of upper-outer quadrant of left breast in female, estrogen receptor positive    3. H/O total hysterectomy     3. juve wirst pain, likely carpel tunnel syndrome OTC intervention discussed      Plan:   There are no diagnoses linked to this encounter.1. Pt had MARQUISE/BSO so will stopped tamoxifen and started Arimidex.  2. AI SE profile discussed, pt kaleb better  3. mammo  And CT scans discussed today showing JAKE from 12/19.   4. Baseline bone density test ordered again today.   5. RTC in 3 months with above testing.     Monae Ag, ANP

## 2020-09-02 ENCOUNTER — TELEPHONE (OUTPATIENT)
Dept: PALLIATIVE MEDICINE | Facility: CLINIC | Age: 35
End: 2020-09-02

## 2020-09-02 NOTE — TELEPHONE ENCOUNTER
[facility]:     Calling patient to advise that our [place] clinic will be closed indefinitely and to assess for any current needs.     No answer, left voicemail with return number of: [phone]     Keturah Butterfield RN

## 2020-09-29 ENCOUNTER — OFFICE VISIT (OUTPATIENT)
Dept: HEMATOLOGY/ONCOLOGY | Facility: CLINIC | Age: 35
End: 2020-09-29
Payer: MEDICAID

## 2020-09-29 VITALS
SYSTOLIC BLOOD PRESSURE: 115 MMHG | RESPIRATION RATE: 16 BRPM | OXYGEN SATURATION: 96 % | TEMPERATURE: 98 F | BODY MASS INDEX: 22.91 KG/M2 | HEIGHT: 64 IN | WEIGHT: 134.19 LBS | HEART RATE: 94 BPM | DIASTOLIC BLOOD PRESSURE: 77 MMHG

## 2020-09-29 DIAGNOSIS — Z17.0 MALIGNANT NEOPLASM OF UPPER-OUTER QUADRANT OF LEFT BREAST IN FEMALE, ESTROGEN RECEPTOR POSITIVE: ICD-10-CM

## 2020-09-29 DIAGNOSIS — C50.412 MALIGNANT NEOPLASM OF UPPER-OUTER QUADRANT OF LEFT BREAST IN FEMALE, ESTROGEN RECEPTOR POSITIVE: ICD-10-CM

## 2020-09-29 DIAGNOSIS — Z90.710 H/O TOTAL HYSTERECTOMY: ICD-10-CM

## 2020-09-29 DIAGNOSIS — Z79.811 LONG TERM (CURRENT) USE OF AROMATASE INHIBITORS: Primary | ICD-10-CM

## 2020-09-29 PROCEDURE — 99214 PR OFFICE/OUTPT VISIT, EST, LEVL IV, 30-39 MIN: ICD-10-PCS | Mod: S$GLB,,, | Performed by: NURSE PRACTITIONER

## 2020-09-29 PROCEDURE — 99214 OFFICE O/P EST MOD 30 MIN: CPT | Mod: S$GLB,,, | Performed by: NURSE PRACTITIONER

## 2020-09-29 NOTE — PROGRESS NOTES
Subjective:      Patient ID: Cindy Escobar is a 35 y.o. female.    Oncology History:  Oncology History   Malignant neoplasm of upper-outer quadrant of left breast in female, estrogen receptor positive   10/26/2017 Cancer Staged    Cancer Staging  Malignant neoplasm of upper-outer quadrant of left breast in female, estrogen receptor positive  Staging form: Onc Breast AJCC V7  - Clinical stage from 10/26/2017: Stage IIB (T2, N1, M0) - Unsigned  - Pathologic stage from 5/21/2018: Stage IB (T1a, N1mi, cM0) - Unsigned     11/13/2017 Genetic Testing    Patient has genetic testing done for Triple Positive Breast Cancer.                                              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     12/14/2017 - 4/18/2018 Chemotherapy    TCHP x 6      7/11/2018 - 12/8/2018 Chemotherapy    Herceptin/Perjeta  q 3 weeks      8/16/2018 - 12/9/2019 Hormone Therapy    Tamoxifen      12/13/2018 Imaging Significant Findings    Brett Mammo Cat 2     12/13/2018 Imaging Significant Findings    CT C/A/P:  JAKE      12/13/2018 Imaging Significant Findings    CT C/A/P JAKE      1/3/2019 - 4/1/2019 Hormone Therapy    Nerlynx     12/9/2019 -  Hormone Therapy    Arimidex started             Chief Complaint: Malignant neoplasm of upper-outer quadrant of left breast in    Cindy Escobar is here for to followup on current antihormonal therapy, tamoxifen and nerlynx. Pt complaint with meds, denies any hot flashes, mood swings, or joint aches at this time.  Pt noticed left breast tenderness over weekend. On PE no mass palpable but area of tenderness with cord-like abnormality noted. Pt states breast swelling comes and goes.     Today, she states she quit Nerlynx in April 2019 due to side effects of diarrhea and upset stomach. Patient is complaint with tamoxifen with no resumption of menstruation. She is feeling good but does c/o left breast swelling and tenderness that comes and goes. Breast exam WNL. Today we  "reviewed her recent CT scans.     19 Visit:  Pt called stating she has resumed her menstrual cycle for the last 2 months and went to her GYN at [facility] who confirmed she was have recurrent menses. Tv/Tp u/s showed juve ovarian cyst, benign appearing. She is here today to discuss starting zoladex monthly along with her Tamoxifen. We discussed evaluation for juve oophrectomy as she states she is "done having kids" with the plan to stop zoladex/tamoxifen after surgery and to start Arimidex in the future. She is agreeable for evaluation, but in the interim she will begin zoladex today.     10/21/19 visit:   Today in clinic, she is declining zoladex injection due to pain associated. She has not a her menstral cycle in the last month and is to see Dr Fagan, GYN to discuss surgical intervention on Wednesday. Will defer zoladex and will re-eval in 2 weeks. She is instructed to continue tamoxifen.     19:  Today she follows up after her recent MARQUISE/BSO on 19 with Dr Fagan. Pathology: benign. She is instructed to stop tamoxifen and begin Arimidex. SE profile discussed. She will return in 6 weeks with base line bone density test, mammo and repeat imaging.     20visit:  Today she is here to discuss recent CT scans and mammo showing JAKE. She states she has not started AI yet and is having problems with mood swings and hot flashes. Will start low dose paxil 1 week prior to starting AI to help manage SE. Advised importance of AI therapy, pt states she will start it next week as instructed. Bone Density to be rescheduled.     6/3/2020:  Pt is feeling great today, compliant with arimidex and no longer taking any other medications. She is not having any side effects from the AI currently.     Imaging:                                   RADIOLOGY REPORT        PT NAME: LUZ ELENA DWYER SRINI      [facility] IMAGING     : 1985 F 34             [address]    ACCT: [id number]                    "                           [place]    [facility] Rec #: [id number]                                        [id number]    [facility]             Procedure: CT ABD   PELVIS WO/W     CONTRAST    REQUISITION #: [id number]      REPORT #: [id number]           DATE OF EXAM: 19    TIME OF EXAM: 1100       CMS MANDATED QUALITY DATA - CT RADIATION - 436        All CT scans at this facility utilizes dose modulation, iterative     reconstruction, and/or weight-based dosing when appropriate to reduce     radiation dose to as low as reasonably achievable.                CT SCAN OF THE ABDOMEN AND PELVIS            HISTORY: Follow-up left breast cancer, C 50.412        COMPARISON: CT scan 2019        TECHNIQUE: Axial helical scanning obtained through the abdomen and pelvis.      Axial, sagittal and coronal images reconstructed. Imaging performed     following IV and oral contrast administration.  Total of 100 cc Isovue-300     given for the CT chest, abdominal and pelvic exams.        Estimated radiation dose in mSv: 22.69 which is inclusive of the CT chest,     abdominal and pelvic CT scans..        FINDINGS:        Lung bases: Please refer to CT chest report same date        Liver: Normal.        Gallbladder: Surgically absent.        Bile ducts: Normal.        Pancreas: Normal        Spleen: Normal.        Adrenal glands: Normal.        Kidneys: Normal.        Aorta: Normal.        Abdomen: No adenopathy, focal fluid collection, ascites, inflammatory change    or free air identified.        Pelvis: No adenopathy, focal fluid collection, ascites, inflammatory change     or free air identified.        Bowel: Normal.        Hernia: None seen.        Appendix: Not seen.        Bones:Unremarkable.        Additional findings: None.            IMPRESSION:        Negative exam.              RADIOLOGY REPORT        PT NAME: LUZ ELENA DWYER SRINI      [facility] IMAGING     : 1985 F 34              [address]    ACCT: [id number]                                              [facility] Rec #: [id number]                                        [id number]    [facility]             Procedure: CHEST THORAX W CONT    REQUISITION #: [id number]      REPORT #: [id number]           DATE OF EXAM: 12/13/19    TIME OF EXAM: 1045       CMS MANDATED QUALITY DATA - CT RADIATION - 436        All CT scans at this facility use dose modulation, iterative reconstruction     and/or weight-based dosing when appropriate to reduce radiation dose to as     low as reasonably achievable.            CT SCAN OF THE CHEST        HISTORY: Left breast cancer, follow-up, C 50.412        COMPARISON: CT scan July 25, 2019        TECHNIQUE: Axial helical scanning obtained through the chest. Axial,     sagittal and coronal images reconstructed. Imaging performed following IV      contrast administration.100 ml ofIsovue 300..        Estimated radiation dose in mSv:  22.69.        FINDINGS:        Stable patchy infiltrate anterior aspect left upper lobe. No pulmonary     nodule or mass.        No pleural effusion or pleural thickening.        No hilar or mediastinal mass or lymphadenopathy. Central airway structures     patent. No pericardial fluid or thickening.        Postsurgical changes left axilla and posterior breast region. No axillary      lymphadenopathy or mass.        No focal bone lesions.        IMPRESSION:        Stable interval appearance.        No acute abnormality.                                            DICTATING PHYSICIAN:  KIRBY CARNES MD                   Date Dictated: 12/13/19 1029        Signed By:  KIRBY CARNES MD <Electronically signed by KIRBY CARNES MD     in OV>    Date Signed:  12/13/19 1034     CC: PRINCESS PORTER NP ; PRINCESS PORTER NP      ADMITTING PHYSICIAN:                                                                                                      ORDERING PHY: PRINCESS PORTER NP                                                                                                                                                          ATTENDING PHY: PRINCESS PORTER NP    Patient Status:  REG CLI    Admit Service Date: 19                 [facility] UNKNOWN RAD EAP                                RADIOLOGY REPORT        PT NAME: LUZ ELENA DWYER      [facility]     : 1985 F 34             [address]    ACCT: [id number]                                              [place]     [facility] [place] Rec #: [id number]                                        [id number]    [place] Location[place].RAD[facility]/             Procedure: FABIOLA DX BALDEV W CAD CAROL    REQUISITION #: [id number]      REPORT #: [id number]           DATE OF EXAM: 19    TIME OF EXAM: 1300       CMS MANDATED QUALITY DATA - MAMMOGRAPHY - 225        This [facility] utilizes a reminder system to ensure that all     patients receive reminder letters. This includes reminders for routine    screening mammograms, diagnostic mammograms or other breast imaging studies     or interventions where appropriate. This patient's information will be     entered into the appropriate reminder system.        Diagnostic breast mammogram with tomography        CLINICAL HISTORY: Prior left breast cancer status post lumpectomy.        COMPARISON: 2018.        FINDINGS:        Mammography:        Digital CC and MLO views of the bilateral breasts. Digital tomographic CC      and MLO images are acquired of the bilateral breasts.        The breast parenchyma is heterogeneously dense. Please note that the     increased breast density limits mammographic sensitivity for detection of      breast cancer.        Surgical clips are present within the upper outer quadrant of the left     breast from prior lumpectomy. There is associated architectural distortion.        No concerning mass, architectural  distortion, or suspicious     microcalcifications.        Secondary evaluation was also performed with computed automated detection.         IMPRESSION: Benign findings, BI-RADS 2.        RECOMMENDATION: Continued annual screening per ACR guidelines.        Based on ACR recommendations, your patient's lifetime risk for developing      breast cancer has been discussed with her today. Because your patient's     family history warrants further evaluation, your patient was offered the     [facility] hereditary cancer testing to help determine potential risks. You can     expect results and further communications to come from our Nurse Navigator     within the next 30 days or once the results have been communicated to the      patient. As part of this service, the Nurse Navigator or Medical Director      will be discussing results and management plan with the patient directly.      Please call our Navigator with any questions.        DICTATING PHYSICIAN:  FABIAN CONNELLY MD                   Date Dictated: 12/18/19 1145        Signed By:  FABIAN CONNELLY MD <Electronically signed by FABIAN CONNELLY MD in     OV>    Date Signed:  12/18/19 1156     CC: PRINCESS PORTER NP ; PRINCESS PORTER NP      ADMITTING PHYSICIAN:                                                                                                     ORDERING PHY: PRINCESS PORTER NP                                                                                                                                                          ATTENDING PHY: PRINCESS PORTER NP    Patient Status:  REG CLI    Admit Service Date: 12/18/19 7/25/19 CT C/A/P:        Past Medical History:   Diagnosis Date    Anxiety     Breast cancer     Other sleep disorders      Family History   Problem Relation Age of Onset    Hypertension Mother     Cancer Mother     Diabetes Mother     Hypertension Father     Hypertension Maternal Grandmother      Social History     Socioeconomic History     Marital status:      Spouse name: Not on file    Number of children: Not on file    Years of education: Not on file    Highest education level: Not on file   Occupational History    Not on file   Social Needs    Financial resource strain: Not on file    Food insecurity     Worry: Not on file     Inability: Not on file    Transportation needs     Medical: Not on file     Non-medical: Not on file   Tobacco Use    Smoking status: Former Smoker    Smokeless tobacco: Never Used   Substance and Sexual Activity    Alcohol use: No     Frequency: Never    Drug use: Yes     Types: Marijuana    Sexual activity: Not on file   Lifestyle    Physical activity     Days per week: Not on file     Minutes per session: Not on file    Stress: Not on file   Relationships    Social connections     Talks on phone: Not on file     Gets together: Not on file     Attends Jain service: Not on file     Active member of club or organization: Not on file     Attends meetings of clubs or organizations: Not on file     Relationship status: Not on file   Other Topics Concern    Not on file   Social History Narrative    Not on file     Past Surgical History:   Procedure Laterality Date    CHOLECYSTECTOMY  2006    HYSTERECTOMY      MASTECTOMY, PARTIAL Left 05/2018    with SLNB    PORTACATH PLACEMENT  12/01/2017           Review of systems:  Review of Systems   Constitutional: Negative for activity change, appetite change, chills, diaphoresis, fatigue, fever and unexpected weight change.   HENT: Negative for congestion, dental problem, mouth sores, nosebleeds, sore throat, tinnitus and voice change.    Eyes: Negative for photophobia, discharge and visual disturbance.   Respiratory: Negative for apnea, cough, choking, chest tightness, shortness of breath, wheezing and stridor.    Cardiovascular: Negative for chest pain, palpitations and leg swelling.   Gastrointestinal: Negative for abdominal distention, abdominal  pain, anal bleeding, blood in stool, constipation, diarrhea (pt reports having slacked off on Nerlynx due to diarrhea; educated on using Lomotil and importance of taking Nerlynx as prescribed), nausea, rectal pain and vomiting.   Endocrine: Negative for cold intolerance and heat intolerance (hot flashes).   Genitourinary: Negative for difficulty urinating, dysuria, hematuria, menstrual problem, vaginal bleeding, vaginal discharge and vaginal pain.   Musculoskeletal: Negative for arthralgias, back pain, gait problem, joint swelling and myalgias.   Skin: Negative for color change, pallor, rash and wound.   Neurological: Negative for dizziness, syncope, light-headedness and numbness.   Hematological: Negative for adenopathy. Does not bruise/bleed easily.   Psychiatric/Behavioral: Negative for agitation, confusion, sleep disturbance and suicidal ideas. The patient is not nervous/anxious.        Objective:     Physical Exam  Constitutional:       Appearance: She is well-developed.   Neck:      Musculoskeletal: Normal range of motion.   Cardiovascular:      Rate and Rhythm: Normal rate and regular rhythm.      Heart sounds: Normal heart sounds.   Pulmonary:      Effort: Pulmonary effort is normal.      Breath sounds: Normal breath sounds.   Chest:      Breasts:         Left: Tenderness present. No inverted nipple, mass, nipple discharge or skin change.       Abdominal:      General: Bowel sounds are normal.      Palpations: Abdomen is soft.   Musculoskeletal: Normal range of motion.   Neurological:      Mental Status: She is alert and oriented to person, place, and time.       Vitals:    09/29/20 1407   BP: 115/77   Pulse: 94   Resp: 16   Temp: 98 °F (36.7 °C)   Body surface area is 1.66 meters squared.    Labs:     Lab Results   Component Value Date    WBC 3.9 (L) 06/03/2020    HGB 13.6 06/03/2020    HCT 39.7 06/03/2020    LABPLAT 196 06/03/2020       CMP  Sodium   Date Value Ref Range Status   06/03/2020 141 135 - 145  mmol/L Final     Potassium   Date Value Ref Range Status   06/03/2020 4.3 3.6 - 5.2 mmol/L Final     Chloride   Date Value Ref Range Status   06/03/2020 105 100 - 108 mmol/L Final     CO2   Date Value Ref Range Status   06/03/2020 30 21 - 32 mmol/L Final     Glucose   Date Value Ref Range Status   06/03/2020 89 70 - 110 mg/dL Final     BUN, Bld   Date Value Ref Range Status   06/03/2020 16 7 - 18 mg/dL Final     Creatinine   Date Value Ref Range Status   06/03/2020 1.09 (H) 0.55 - 1.02 mg/dL Final     Calcium   Date Value Ref Range Status   06/03/2020 9.1 8.8 - 10.5 mg/dL Final     Total Protein   Date Value Ref Range Status   06/03/2020 8.0 6.4 - 8.2 g/dL Final     Albumin   Date Value Ref Range Status   06/03/2020 4.3 3.4 - 5.0 g/dL Final     Total Bilirubin   Date Value Ref Range Status   06/03/2020 0.6 0.0 - 1.0 mg/dL Final     Alkaline Phosphatase   Date Value Ref Range Status   06/03/2020 79 46 - 116 U/L Final     AST   Date Value Ref Range Status   06/03/2020 21 15 - 37 U/L Final     ALT   Date Value Ref Range Status   06/03/2020 23 12 - 78 U/L Final     Anion Gap   Date Value Ref Range Status   06/03/2020 6.0 3.0 - 11.0 mmol/L Final         Assessment:      1. Long term (current) use of aromatase inhibitors    2. Malignant neoplasm of upper-outer quadrant of left breast in female, estrogen receptor positive    3. H/O total hysterectomy    4. juve wirst pain, likely carpel tunnel syndrome OTC intervention discussed      Plan:   There are no diagnoses linked to this encounter.1. Pt had MARQUISE/BSO so will stopped tamoxifen and started Arimidex.  2. AI SE profile discussed, pt kaleb better  3. mammo  And CT scans discussed today showing JAKE from 12/19.   4. Baseline bone density test ordered again today.   5. RTC in 3 months with above testing.     Monae Ag, ANP

## 2020-10-14 ENCOUNTER — TELEPHONE (OUTPATIENT)
Dept: HEMATOLOGY/ONCOLOGY | Facility: CLINIC | Age: 35
End: 2020-10-14

## 2020-10-14 NOTE — TELEPHONE ENCOUNTER
Results given from recent scan.  abnormal result given. Some osteopenia noted, pt needs to start OTC calcium and Vit D. States understanding.

## 2021-06-28 ENCOUNTER — OFFICE VISIT (OUTPATIENT)
Dept: HEMATOLOGY/ONCOLOGY | Facility: CLINIC | Age: 36
End: 2021-06-28
Payer: MEDICAID

## 2021-06-28 VITALS
RESPIRATION RATE: 16 BRPM | BODY MASS INDEX: 22.93 KG/M2 | OXYGEN SATURATION: 100 % | TEMPERATURE: 98 F | DIASTOLIC BLOOD PRESSURE: 68 MMHG | HEART RATE: 67 BPM | HEIGHT: 64 IN | SYSTOLIC BLOOD PRESSURE: 121 MMHG | WEIGHT: 134.31 LBS

## 2021-06-28 DIAGNOSIS — Z79.811 LONG TERM (CURRENT) USE OF AROMATASE INHIBITORS: ICD-10-CM

## 2021-06-28 DIAGNOSIS — C50.412 MALIGNANT NEOPLASM OF UPPER-OUTER QUADRANT OF LEFT BREAST IN FEMALE, ESTROGEN RECEPTOR POSITIVE: Primary | ICD-10-CM

## 2021-06-28 DIAGNOSIS — Z90.710 H/O TOTAL HYSTERECTOMY: ICD-10-CM

## 2021-06-28 DIAGNOSIS — Z17.0 MALIGNANT NEOPLASM OF UPPER-OUTER QUADRANT OF LEFT BREAST IN FEMALE, ESTROGEN RECEPTOR POSITIVE: Primary | ICD-10-CM

## 2021-06-28 LAB
ALBUMIN SERPL BCP-MCNC: 4.1 G/DL (ref 3.4–5)
ALBUMIN/GLOBULIN RATIO: 1.08 RATIO (ref 1.1–1.8)
ALP SERPL-CCNC: 68 U/L (ref 46–116)
ALT SERPL W P-5'-P-CCNC: 28 U/L (ref 12–78)
ANION GAP SERPL CALC-SCNC: 8 MMOL/L (ref 3–11)
AST SERPL-CCNC: 25 U/L (ref 15–37)
BANDS: 2 % (ref 0–5)
BILIRUB SERPL-MCNC: 0.7 MG/DL (ref 0–1)
BUN SERPL-MCNC: 21 MG/DL (ref 7–18)
BUN/CREAT SERPL: 21 RATIO (ref 7–18)
CALCIUM SERPL-MCNC: 9 MG/DL (ref 8.8–10.5)
CELLS COUNTED: 100
CHLORIDE SERPL-SCNC: 104 MMOL/L (ref 100–108)
CO2 SERPL-SCNC: 29 MMOL/L (ref 21–32)
CREAT SERPL-MCNC: 1 MG/DL (ref 0.55–1.02)
EOSINOPHIL NFR BLD: 2 % (ref 1–3)
ERYTHROCYTE [DISTWIDTH] IN BLOOD BY AUTOMATED COUNT: 12.3 % (ref 12.5–18)
GFR ESTIMATION: > 60
GLOBULIN: 3.8 G/DL (ref 2.3–3.5)
GLUCOSE SERPL-MCNC: 91 MG/DL (ref 70–110)
HCT VFR BLD AUTO: 39.9 % (ref 37–47)
HGB BLD-MCNC: 13.2 G/DL (ref 12–16)
LYMPHOCYTES NFR BLD: 33 % (ref 25–40)
MCH RBC QN AUTO: 30.5 PG (ref 27–31.2)
MCHC RBC AUTO-ENTMCNC: 33.1 G/DL (ref 31.8–35.4)
MCV RBC AUTO: 92.1 FL (ref 80–97)
MONOCYTES NFR BLD: 11 % (ref 1–15)
NEUTROPHILS # BLD AUTO: 1.6 10*3/UL (ref 1.8–7.7)
NEUTROPHILS NFR BLD: 52 % (ref 37–80)
NUCLEATED RED BLOOD CELLS: 0 %
PLATELETS: 198 10*3/UL (ref 142–424)
POTASSIUM SERPL-SCNC: 4.2 MMOL/L (ref 3.6–5.2)
PROT SERPL-MCNC: 7.9 G/DL (ref 6.4–8.2)
RBC # BLD AUTO: 4.33 10*6/UL (ref 4.2–5.4)
RBC MORPH BLD: ABNORMAL
SMALL PLATELETS BLD QL SMEAR: ADEQUATE
SODIUM BLD-SCNC: 141 MMOL/L (ref 135–145)
WBC # BLD: 3 10*3/UL (ref 4.6–10.2)

## 2021-06-28 PROCEDURE — 99214 OFFICE O/P EST MOD 30 MIN: CPT | Mod: S$GLB,,, | Performed by: NURSE PRACTITIONER

## 2021-06-28 PROCEDURE — 99214 PR OFFICE/OUTPT VISIT, EST, LEVL IV, 30-39 MIN: ICD-10-PCS | Mod: S$GLB,,, | Performed by: NURSE PRACTITIONER

## 2021-06-28 RX ORDER — ANASTROZOLE 1 MG/1
1 TABLET ORAL DAILY
Qty: 30 TABLET | Refills: 11 | Status: SHIPPED | OUTPATIENT
Start: 2021-06-28 | End: 2021-06-28

## 2021-06-28 RX ORDER — ANASTROZOLE 1 MG/1
1 TABLET ORAL DAILY
Qty: 30 TABLET | Refills: 11 | Status: SHIPPED | OUTPATIENT
Start: 2021-06-28 | End: 2022-03-18 | Stop reason: SDUPTHER

## 2021-07-06 ENCOUNTER — TELEPHONE (OUTPATIENT)
Dept: HEMATOLOGY/ONCOLOGY | Facility: CLINIC | Age: 36
End: 2021-07-06

## 2021-08-12 ENCOUNTER — TELEPHONE (OUTPATIENT)
Dept: HEMATOLOGY/ONCOLOGY | Facility: CLINIC | Age: 36
End: 2021-08-12

## 2021-08-31 ENCOUNTER — OFFICE VISIT (OUTPATIENT)
Dept: HEMATOLOGY/ONCOLOGY | Facility: CLINIC | Age: 36
End: 2021-08-31
Payer: MEDICAID

## 2021-08-31 VITALS
BODY MASS INDEX: 22.99 KG/M2 | RESPIRATION RATE: 16 BRPM | DIASTOLIC BLOOD PRESSURE: 76 MMHG | SYSTOLIC BLOOD PRESSURE: 116 MMHG | WEIGHT: 134.69 LBS | HEIGHT: 64 IN | HEART RATE: 81 BPM | TEMPERATURE: 99 F | OXYGEN SATURATION: 98 %

## 2021-08-31 DIAGNOSIS — Z79.811 LONG TERM (CURRENT) USE OF AROMATASE INHIBITORS: ICD-10-CM

## 2021-08-31 DIAGNOSIS — C50.412 MALIGNANT NEOPLASM OF UPPER-OUTER QUADRANT OF LEFT BREAST IN FEMALE, ESTROGEN RECEPTOR POSITIVE: ICD-10-CM

## 2021-08-31 DIAGNOSIS — R92.8 ABNORMAL MAMMOGRAM OF LEFT BREAST: Primary | ICD-10-CM

## 2021-08-31 DIAGNOSIS — Z90.710 H/O TOTAL HYSTERECTOMY: ICD-10-CM

## 2021-08-31 DIAGNOSIS — Z17.0 MALIGNANT NEOPLASM OF UPPER-OUTER QUADRANT OF LEFT BREAST IN FEMALE, ESTROGEN RECEPTOR POSITIVE: ICD-10-CM

## 2021-08-31 PROCEDURE — 99214 PR OFFICE/OUTPT VISIT, EST, LEVL IV, 30-39 MIN: ICD-10-PCS | Mod: S$GLB,,, | Performed by: NURSE PRACTITIONER

## 2021-08-31 PROCEDURE — 99214 OFFICE O/P EST MOD 30 MIN: CPT | Mod: S$GLB,,, | Performed by: NURSE PRACTITIONER

## 2021-09-03 ENCOUNTER — TELEPHONE (OUTPATIENT)
Dept: HEMATOLOGY/ONCOLOGY | Facility: CLINIC | Age: 36
End: 2021-09-03

## 2022-03-17 DIAGNOSIS — R92.8 ABNORMAL MAMMOGRAM OF LEFT BREAST: Primary | ICD-10-CM

## 2022-03-18 ENCOUNTER — OFFICE VISIT (OUTPATIENT)
Dept: HEMATOLOGY/ONCOLOGY | Facility: CLINIC | Age: 37
End: 2022-03-18
Payer: MEDICAID

## 2022-03-18 ENCOUNTER — TELEPHONE (OUTPATIENT)
Dept: HEMATOLOGY/ONCOLOGY | Facility: CLINIC | Age: 37
End: 2022-03-18

## 2022-03-18 ENCOUNTER — CLINICAL SUPPORT (OUTPATIENT)
Dept: HEMATOLOGY/ONCOLOGY | Facility: CLINIC | Age: 37
End: 2022-03-18
Payer: MEDICAID

## 2022-03-18 VITALS
OXYGEN SATURATION: 95 % | BODY MASS INDEX: 24.18 KG/M2 | HEIGHT: 64 IN | RESPIRATION RATE: 16 BRPM | DIASTOLIC BLOOD PRESSURE: 82 MMHG | WEIGHT: 141.63 LBS | HEART RATE: 76 BPM | SYSTOLIC BLOOD PRESSURE: 122 MMHG

## 2022-03-18 DIAGNOSIS — Z79.811 LONG TERM (CURRENT) USE OF AROMATASE INHIBITORS: ICD-10-CM

## 2022-03-18 DIAGNOSIS — Z17.0 MALIGNANT NEOPLASM OF UPPER-OUTER QUADRANT OF LEFT BREAST IN FEMALE, ESTROGEN RECEPTOR POSITIVE: Primary | ICD-10-CM

## 2022-03-18 DIAGNOSIS — R92.8 ABNORMAL MAMMOGRAM OF LEFT BREAST: ICD-10-CM

## 2022-03-18 DIAGNOSIS — C50.412 MALIGNANT NEOPLASM OF UPPER-OUTER QUADRANT OF LEFT BREAST IN FEMALE, ESTROGEN RECEPTOR POSITIVE: Primary | ICD-10-CM

## 2022-03-18 LAB
ALBUMIN SERPL BCP-MCNC: 4.3 G/DL (ref 3.4–5)
ALBUMIN/GLOBULIN RATIO: 1.23 RATIO (ref 1.1–1.8)
ALP SERPL-CCNC: 81 U/L (ref 46–116)
ALT SERPL W P-5'-P-CCNC: 21 U/L (ref 12–78)
ANION GAP SERPL CALC-SCNC: 10 MMOL/L (ref 3–11)
AST SERPL-CCNC: 21 U/L (ref 15–37)
BANDS: 1 % (ref 0–5)
BILIRUB SERPL-MCNC: 0.5 MG/DL (ref 0–1)
BUN SERPL-MCNC: 17 MG/DL (ref 7–18)
BUN/CREAT SERPL: 17 RATIO (ref 7–18)
CALCIUM SERPL-MCNC: 8.8 MG/DL (ref 8.8–10.5)
CELLS COUNTED: 100
CHLORIDE SERPL-SCNC: 105 MMOL/L (ref 100–108)
CO2 SERPL-SCNC: 28 MMOL/L (ref 21–32)
CREAT SERPL-MCNC: 1 MG/DL (ref 0.55–1.02)
EOSINOPHIL NFR BLD: 10 % (ref 1–3)
ERYTHROCYTE [DISTWIDTH] IN BLOOD BY AUTOMATED COUNT: 12.4 % (ref 12.5–18)
GFR ESTIMATION: > 60
GLOBULIN: 3.5 G/DL (ref 2.3–3.5)
GLUCOSE SERPL-MCNC: 93 MG/DL (ref 70–110)
HCT VFR BLD AUTO: 40 % (ref 37–47)
HGB BLD-MCNC: 13.5 G/DL (ref 12–16)
LYMPHOCYTES NFR BLD: 36 % (ref 25–40)
MCH RBC QN AUTO: 31 PG (ref 27–31.2)
MCHC RBC AUTO-ENTMCNC: 33.8 G/DL (ref 31.8–35.4)
MCV RBC AUTO: 92 FL (ref 80–97)
MONOCYTES NFR BLD: 5 % (ref 1–15)
NEUTROPHILS # BLD AUTO: 1.6 10*3/UL (ref 1.8–7.7)
NEUTROPHILS NFR BLD: 48 % (ref 37–80)
NUCLEATED RED BLOOD CELLS: 0 %
PLATELETS: 202 10*3/UL (ref 142–424)
POTASSIUM SERPL-SCNC: 4.4 MMOL/L (ref 3.6–5.2)
PROT SERPL-MCNC: 7.8 G/DL (ref 6.4–8.2)
RBC # BLD AUTO: 4.35 10*6/UL (ref 4.2–5.4)
RBC MORPH BLD: ABNORMAL
SMALL PLATELETS BLD QL SMEAR: ADEQUATE
SODIUM BLD-SCNC: 143 MMOL/L (ref 135–145)
WBC # BLD: 3.2 10*3/UL (ref 4.6–10.2)

## 2022-03-18 PROCEDURE — 99214 PR OFFICE/OUTPT VISIT, EST, LEVL IV, 30-39 MIN: ICD-10-PCS | Mod: S$GLB,,, | Performed by: NURSE PRACTITIONER

## 2022-03-18 PROCEDURE — 3074F SYST BP LT 130 MM HG: CPT | Mod: CPTII,S$GLB,, | Performed by: NURSE PRACTITIONER

## 2022-03-18 PROCEDURE — 99214 OFFICE O/P EST MOD 30 MIN: CPT | Mod: S$GLB,,, | Performed by: NURSE PRACTITIONER

## 2022-03-18 PROCEDURE — 3079F PR MOST RECENT DIASTOLIC BLOOD PRESSURE 80-89 MM HG: ICD-10-PCS | Mod: CPTII,S$GLB,, | Performed by: NURSE PRACTITIONER

## 2022-03-18 PROCEDURE — 3074F PR MOST RECENT SYSTOLIC BLOOD PRESSURE < 130 MM HG: ICD-10-PCS | Mod: CPTII,S$GLB,, | Performed by: NURSE PRACTITIONER

## 2022-03-18 PROCEDURE — 1159F MED LIST DOCD IN RCRD: CPT | Mod: CPTII,S$GLB,, | Performed by: NURSE PRACTITIONER

## 2022-03-18 PROCEDURE — 3008F PR BODY MASS INDEX (BMI) DOCUMENTED: ICD-10-PCS | Mod: CPTII,S$GLB,, | Performed by: NURSE PRACTITIONER

## 2022-03-18 PROCEDURE — 3008F BODY MASS INDEX DOCD: CPT | Mod: CPTII,S$GLB,, | Performed by: NURSE PRACTITIONER

## 2022-03-18 PROCEDURE — 1159F PR MEDICATION LIST DOCUMENTED IN MEDICAL RECORD: ICD-10-PCS | Mod: CPTII,S$GLB,, | Performed by: NURSE PRACTITIONER

## 2022-03-18 PROCEDURE — 3079F DIAST BP 80-89 MM HG: CPT | Mod: CPTII,S$GLB,, | Performed by: NURSE PRACTITIONER

## 2022-03-18 RX ORDER — ANASTROZOLE 1 MG/1
1 TABLET ORAL DAILY
Qty: 30 TABLET | Refills: 11 | Status: SHIPPED | OUTPATIENT
Start: 2022-03-18 | End: 2022-10-18

## 2022-03-18 NOTE — PROGRESS NOTES
Subjective:      Patient ID: Cindy Escobar is a 36 y.o. female.    Oncology History:  Oncology History   Malignant neoplasm of upper-outer quadrant of left breast in female, estrogen receptor positive   10/26/2017 Cancer Staged    Cancer Staging  Malignant neoplasm of upper-outer quadrant of left breast in female, estrogen receptor positive  Staging form: Onc Breast AJCC V7  - Clinical stage from 10/26/2017: Stage IIB (T2, N1, M0) - Unsigned  - Pathologic stage from 5/21/2018: Stage IB (T1a, N1mi, cM0) - Unsigned     11/13/2017 Genetic Testing    Patient has genetic testing done for Triple Positive Breast Cancer.                                              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     12/14/2017 - 4/18/2018 Chemotherapy    TCHP x 6      7/11/2018 - 12/8/2018 Chemotherapy    Herceptin/Perjeta  q 3 weeks      8/16/2018 - 12/9/2019 Hormone Therapy    Tamoxifen      12/13/2018 Imaging Significant Findings    Brett Mammo Cat 2     12/13/2018 Imaging Significant Findings    CT C/A/P:  JAKE      12/13/2018 Imaging Significant Findings    CT C/A/P JAKE      1/3/2019 - 4/1/2019 Hormone Therapy    Nerlynx     12/9/2019 -  Hormone Therapy    Arimidex started             Chief Complaint: Abnormal mammogram of left breast    Cindy Escobar is here for to followup on current antihormonal therapy, tamoxifen and nerlynx. Pt complaint with meds, denies any hot flashes, mood swings, or joint aches at this time.  Pt noticed left breast tenderness over weekend. On PE no mass palpable but area of tenderness with cord-like abnormality noted. Pt states breast swelling comes and goes.     Today, she states she quit Nerlynx in April 2019 due to side effects of diarrhea and upset stomach. Patient is complaint with tamoxifen with no resumption of menstruation. She is feeling good but does c/o left breast swelling and tenderness that comes and goes. Breast exam WNL. Today we reviewed her recent CT  "scans.     19 Visit:  Pt called stating she has resumed her menstrual cycle for the last 2 months and went to her GYN at [facility] who confirmed she was have recurrent menses. Tv/Tp u/s showed juve ovarian cyst, benign appearing. She is here today to discuss starting zoladex monthly along with her Tamoxifen. We discussed evaluation for juve oophrectomy as she states she is "done having kids" with the plan to stop zoladex/tamoxifen after surgery and to start Arimidex in the future. She is agreeable for evaluation, but in the interim she will begin zoladex today.     10/21/19 visit:   Today in clinic, she is declining zoladex injection due to pain associated. She has not a her menstral cycle in the last month and is to see Dr Fagan, GYN to discuss surgical intervention on Wednesday. Will defer zoladex and will re-eval in 2 weeks. She is instructed to continue tamoxifen.     19:  Today she follows up after her recent MARQUISE/BSO on 19 with Dr Fagan. Pathology: benign. She is instructed to stop tamoxifen and begin Arimidex. SE profile discussed. She will return in 6 weeks with base line bone density test, mammo and repeat imaging.     20visit:  Today she is here to discuss recent CT scans and mammo showing JAKE. She states she has not started AI yet and is having problems with mood swings and hot flashes. Will start low dose paxil 1 week prior to starting AI to help manage SE. Advised importance of AI therapy, pt states she will start it next week as instructed. Bone Density to be rescheduled.     6/3/2020:  Pt is feeling great today, compliant with arimidex and no longer taking any other medications. She is not having any side effects from the AI currently.     Imaging:                                   RADIOLOGY REPORT        PT NAME: LUZ ELENA DWYER SRINI      [facility]     : 1985 F 34             [address]    ACCT: [id number]                                           "    [place]    [facility] Rec #: [id number]                                        [id number]    [facility]             Procedure: CT ABD   PELVIS WO/W     CONTRAST    REQUISITION #: [id number]      REPORT #: [id number]           DATE OF EXAM: 19    TIME OF EXAM: 1100       CMS MANDATED QUALITY DATA - CT RADIATION - 436        All CT scans at this facility utilizes dose modulation, iterative     reconstruction, and/or weight-based dosing when appropriate to reduce     radiation dose to as low as reasonably achievable.                CT SCAN OF THE ABDOMEN AND PELVIS            HISTORY: Follow-up left breast cancer, C 50.412        COMPARISON: CT scan 2019        TECHNIQUE: Axial helical scanning obtained through the abdomen and pelvis.      Axial, sagittal and coronal images reconstructed. Imaging performed     following IV and oral contrast administration.  Total of 100 cc Isovue-300     given for the CT chest, abdominal and pelvic exams.        Estimated radiation dose in mSv: 22.69 which is inclusive of the CT chest,     abdominal and pelvic CT scans..        FINDINGS:        Lung bases: Please refer to CT chest report same date        Liver: Normal.        Gallbladder: Surgically absent.        Bile ducts: Normal.        Pancreas: Normal        Spleen: Normal.        Adrenal glands: Normal.        Kidneys: Normal.        Aorta: Normal.        Abdomen: No adenopathy, focal fluid collection, ascites, inflammatory change    or free air identified.        Pelvis: No adenopathy, focal fluid collection, ascites, inflammatory change     or free air identified.        Bowel: Normal.        Hernia: None seen.        Appendix: Not seen.        Bones:Unremarkable.        Additional findings: None.            IMPRESSION:        Negative exam.              RADIOLOGY REPORT        PT NAME: LUZ ELENA DWYER SRINI      [facility]     : 1985 F 34             [address]  [facility]    ACCT: [id number]                                              [facility] Rec #: [id number]                                        [id number]    [facility]             Procedure: CHEST THORAX W CONT    REQUISITION #: [id number]      REPORT #: [id number]           DATE OF EXAM: 12/13/19    TIME OF EXAM: 1045       CMS MANDATED QUALITY DATA - CT RADIATION - 436        All CT scans at this facility use dose modulation, iterative reconstruction     and/or weight-based dosing when appropriate to reduce radiation dose to as     low as reasonably achievable.            CT SCAN OF THE CHEST        HISTORY: Left breast cancer, follow-up, C 50.412        COMPARISON: CT scan July 25, 2019        TECHNIQUE: Axial helical scanning obtained through the chest. Axial,     sagittal and coronal images reconstructed. Imaging performed following IV      contrast administration.100 ml ofIsovue 300..        Estimated radiation dose in mSv:  22.69.        FINDINGS:        Stable patchy infiltrate anterior aspect left upper lobe. No pulmonary     nodule or mass.        No pleural effusion or pleural thickening.        No hilar or mediastinal mass or lymphadenopathy. Central airway structures     patent. No pericardial fluid or thickening.        Postsurgical changes left axilla and posterior breast region. No axillary      lymphadenopathy or mass.        No focal bone lesions.        IMPRESSION:        Stable interval appearance.        No acute abnormality.                                            DICTATING PHYSICIAN:  KIRBY CARNES MD                   Date Dictated: 12/13/19 1029        Signed By:  KIRBY CARNES MD <Electronically signed by KIRBY CARNES MD     in OV>    Date Signed:  12/13/19 1034     CC: PRINCESS PORTER NP ; PRINCESS PORTER NP      ADMITTING PHYSICIAN:                                                                                                     ORDERING PHY:  PRINCESS PORTER NP                                                                                                                                                          ATTENDING PHY: PRINCESS PORTER NP    Patient Status:  REG CLI    Admit Service Date: 19                 [facility] UNKNOWN RAD EAP                                RADIOLOGY REPORT        PT NAME: LUZ ELENA DWYER      [facility]     : 1985 F 36             [address]    ACCT: [id number]                                              [facility] Rec #: [id number]                                        [id number]    [place]             Procedure: FABIOLA DX BALDEV W CAD CAROL    REQUISITION #: [id number]      REPORT #: [id number]           DATE OF EXAM: 21    TIME OF EXAM: 0900       FABIOLA DX BALDEV W CAD CAROL, BREAST UNILAT LIMITED        CLINICAL HISTORY:    History of a left lumpectomy in 2017 with radiation chemotherapy.        TECHNIQUE:    Views: Craniocaudal and mediolateral oblique views of both breasts with   tomographic images were performed. Additionally, magnified spot view of the    left breast in a CC projection and in ultrasound left breast was performed.    CAD: Performed on the nontomographic images.    Limitations: No technical limitations.        COMPARISON:    Studies compared to the previous examination of 2019 and   2018        FINDINGS:    Breast density: Very dense. Category D. This lowers the sensitivity and   obscures lesions.        Masses or asymmetries: An area of asymmetric density is developed in the   medial aspect of the left breast on CC view only. This appears to be less   prominent with cone-down compression.        Calcifications: None seen.        Skin changes: None seen.        Additional findings: Sonography of the medial aspect of the left breast   shows no significant abnormality. Only normal-appearing breast tissue is   present.         IMPRESSION:    BI-RADS Category 3: Probable benign findings        RECOMMENDATION:    6 month mammographic follow-up of the left breast is recommended.        This facility uses a reminder system to ensure that all patients receive a   reminder letters and/or direct phone calls for appointments. This includes   reminders for routine screening mammograms, diagnostic mammograms, and other   breast interventions when appropriate. The patient will be placed in the   appropriate reminder system.            Based on ACR recommendations, your patient's lifetime risk for developing   breast cancer has been discussed with her today. Because your patient's   family history warrants further evaluation, your patient was offered the   [facility] hereditary cancer testing to help determine potential risks. You can    expect results and further communications to come from our Nurse Navigator   within the next 30 days or once the results have been communicated to the   patient. As part of this service, the Nurse Navigator or Medical Director   will be discussing results and management plan with the patient directly.   Please call our Navigator with any questions.        This document was created using a voice recognition transcribing system.   Incorrect words or phrases may have been missed during proof reading. Please   interpret accordingly or contact the radiologist for clarification if   necessary.        DICTATING PHYSICIAN:  [place] HUBBARD MD                   Date Dictated: 07/19/21 1234        Signed By:  [place] HUBBARD MD <Electronically signed by [place] HUBBARD MD in OV>    Date Signed:  07/19/21 1237     CC: PRINCESS PORTER NP ; PRINCESS PORTER NP      ADMITTING PHYSICIAN:                                                                                                    ORDERING PHY: PRINCESS PORTER NP                                                                                                                                                       ATTENDING PHY: PRINCESS PORTER NP    Patient Status:  REG CLI    Admit Service Date: 21       [facility] UNKNOWN RAD EAP                                RADIOLOGY REPORT        PT NAME: LUZ ELENA DWYER [facility]     : 1985 F 36             [address]    ACCT: [id number]                                              [place], [place]    [facility] Rec #: [id number]                                        [address]    [place]             Procedure: BREAST UNILAT LIMITED    REQUISITION #: [id number]      REPORT #: [id number]           DATE OF EXAM: 21    TIME OF EXAM:        FABIOLA DX BALDEV W CAD CAROL, BREAST UNILAT LIMITED        CLINICAL HISTORY:    History of a left lumpectomy in 2017 with radiation chemotherapy.        TECHNIQUE:    Views: Craniocaudal and mediolateral oblique views of both breasts with   tomographic images were performed. Additionally, magnified spot view of the    left breast in a CC projection and in ultrasound left breast was performed.    CAD: Performed on the nontomographic images.    Limitations: No technical limitations.        COMPARISON:    Studies compared to the previous examination of 2019 and   2018        FINDINGS:    Breast density: Very dense. Category D. This lowers the sensitivity and   obscures lesions.        Masses or asymmetries: An area of asymmetric density is developed in the   medial aspect of the left breast on CC view only. This appears to be less   prominent with cone-down compression.        Calcifications: None seen.        Skin changes: None seen.        Additional findings: Sonography of the medial aspect of the left breast   shows no significant abnormality. Only normal-appearing breast tissue is   present.        IMPRESSION:    BI-RADS Category 3: Probable benign findings        RECOMMENDATION:    6 month mammographic follow-up of the left breast is  recommended.        This facility uses a reminder system to ensure that all patients receive a   reminder letters and/or direct phone calls for appointments. This includes   reminders for routine screening mammograms, diagnostic mammograms, and other   breast interventions when appropriate. The patient will be placed in the   appropriate reminder system.            Based on ACR recommendations, your patient's lifetime risk for developing   breast cancer has been discussed with her today. Because your patient's   family history warrants further evaluation, your patient was offered the   [facility] hereditary cancer testing to help determine potential risks. You can    expect results and further communications to come from our Nurse Navigator   within the next 30 days or once the results have been communicated to the   patient. As part of this service, the Nurse Navigator or Medical Director   will be discussing results and management plan with the patient directly.   Please call our Navigator with any questions.        This document was created using a voice recognition transcribing system.   Incorrect words or phrases may have been missed during proof reading. Please   interpret accordingly or contact the radiologist for clarification if   necessary.        DICTATING PHYSICIAN:  [place] HUBBARD MD                   Date Dictated: 07/19/21 1234        Signed By:  [place] HUBBARD MD <Electronically signed by [place] HUBBARD MD in OV>    Date Signed:  07/19/21 1237     CC: PRINCESS PORTER NP ; PRINCESS PORTER NP      ADMITTING PHYSICIAN:                                                                                                    ORDERING PHY: PRINCESS PORTER NP                                                                                                                                                      ATTENDING PHY: PRINCESS PORTER NP    Patient Status:  REG CLI    Admit Service Date: 07/19/21 7/25/19  CT C/A/P:        Past Medical History:   Diagnosis Date    Anxiety     Breast cancer     Other sleep disorders      Family History   Problem Relation Age of Onset    Hypertension Mother     Cancer Mother     Diabetes Mother     Hypertension Father     Hypertension Maternal Grandmother      Social History     Socioeconomic History    Marital status:    Tobacco Use    Smoking status: Former Smoker    Smokeless tobacco: Never Used   Substance and Sexual Activity    Alcohol use: No    Drug use: Yes     Types: Marijuana     Past Surgical History:   Procedure Laterality Date    CHOLECYSTECTOMY  2006    HYSTERECTOMY      MASTECTOMY, PARTIAL Left 05/2018    with SLNB    PORTACATH PLACEMENT  12/01/2017           Review of systems:  Review of Systems   Constitutional: Negative for activity change, appetite change, chills, diaphoresis, fatigue, fever and unexpected weight change.   HENT: Negative for congestion, dental problem, mouth sores, nosebleeds, sore throat, tinnitus and voice change.    Eyes: Negative for photophobia, discharge and visual disturbance.   Respiratory: Negative for apnea, cough, choking, chest tightness, shortness of breath, wheezing and stridor.    Cardiovascular: Negative for chest pain, palpitations and leg swelling.   Gastrointestinal: Negative for abdominal distention, abdominal pain, anal bleeding, blood in stool, constipation, diarrhea (pt reports having slacked off on Nerlynx due to diarrhea; educated on using Lomotil and importance of taking Nerlynx as prescribed), nausea, rectal pain and vomiting.   Endocrine: Negative for cold intolerance and heat intolerance (hot flashes).   Genitourinary: Negative for difficulty urinating, dysuria, hematuria, menstrual problem, vaginal bleeding, vaginal discharge and vaginal pain.   Musculoskeletal: Negative for arthralgias, back pain, gait problem, joint swelling and myalgias.   Skin: Negative for color change, pallor, rash and wound.    Neurological: Negative for dizziness, syncope, light-headedness and numbness.   Hematological: Negative for adenopathy. Does not bruise/bleed easily.   Psychiatric/Behavioral: Negative for agitation, confusion, sleep disturbance and suicidal ideas. The patient is not nervous/anxious.        Objective:     Physical Exam  Constitutional:       Appearance: She is well-developed.   Cardiovascular:      Rate and Rhythm: Normal rate and regular rhythm.      Heart sounds: Normal heart sounds.   Pulmonary:      Effort: Pulmonary effort is normal.      Breath sounds: Normal breath sounds.   Chest:   Breasts:      Left: Tenderness present. No inverted nipple, mass, nipple discharge or skin change.         Abdominal:      General: Bowel sounds are normal.      Palpations: Abdomen is soft.   Musculoskeletal:         General: Normal range of motion.      Cervical back: Normal range of motion.   Neurological:      Mental Status: She is alert and oriented to person, place, and time.       Vitals:    03/18/22 1019   BP: 122/82   Pulse: 76   Resp: 16   Body surface area is 1.7 meters squared.    Labs:     Lab Results   Component Value Date    WBC 3.2 (L) 03/18/2022    HGB 13.5 03/18/2022    HCT 40.0 03/18/2022    MCV 92.0 03/18/2022    LABPLAT 202 03/18/2022         CMP  Sodium   Date Value Ref Range Status   03/18/2022 143 135 - 145 mmol/L Final     Potassium   Date Value Ref Range Status   03/18/2022 4.4 3.6 - 5.2 mmol/L Final     Chloride   Date Value Ref Range Status   03/18/2022 105 100 - 108 mmol/L Final     CO2   Date Value Ref Range Status   03/18/2022 28 21 - 32 mmol/L Final     Glucose   Date Value Ref Range Status   03/18/2022 93 70 - 110 mg/dL Final     BUN   Date Value Ref Range Status   03/18/2022 17 7 - 18 mg/dL Final     Creatinine   Date Value Ref Range Status   03/18/2022 1.00 0.55 - 1.02 mg/dL Final     Calcium   Date Value Ref Range Status   03/18/2022 8.8 8.8 - 10.5 mg/dL Final     Total Protein   Date Value  Ref Range Status   2022 7.8 6.4 - 8.2 g/dL Final     Albumin   Date Value Ref Range Status   2022 4.3 3.4 - 5.0 g/dL Final     Total Bilirubin   Date Value Ref Range Status   2022 0.5 0.0 - 1.0 mg/dL Final     Alkaline Phosphatase   Date Value Ref Range Status   2022 81 46 - 116 U/L Final     AST   Date Value Ref Range Status   2022 21 15 - 37 U/L Final     ALT   Date Value Ref Range Status   2022 21 12 - 78 U/L Final     Anion Gap   Date Value Ref Range Status   2022 10.0 3.0 - 11.0 mmol/L Final     [facility] UNKNOWN RAD EAP                                RADIOLOGY REPORT        PT NAME: LUZ ELENA DWYER      [facility]     : 1985 F 36             [address]    ACCT: [id number]                                              [facility] Rec #: [id number]                                        [address]    [place]             Procedure: FABIOLA DX BALDEV W CAD CAROL    REQUISITION #: [id number]      REPORT #: [id number]           DATE OF EXAM: 21    TIME OF EXAM: 0900       FABIOLA DX BALDEV W CAD CAROL, BREAST UNILAT LIMITED        CLINICAL HISTORY:    History of a left lumpectomy in 2017 with radiation chemotherapy.        TECHNIQUE:    Views: Craniocaudal and mediolateral oblique views of both breasts with   tomographic images were performed. Additionally, magnified spot view of the    left breast in a CC projection and in ultrasound left breast was performed.    CAD: Performed on the nontomographic images.    Limitations: No technical limitations.        COMPARISON:    Studies compared to the previous examination of 2019 and   2018        FINDINGS:    Breast density: Very dense. Category D. This lowers the sensitivity and   obscures lesions.        Masses or asymmetries: An area of asymmetric density is developed in the   medial aspect of the left breast on CC view only. This appears to be less    prominent with cone-down compression.        Calcifications: None seen.        Skin changes: None seen.        Additional findings: Sonography of the medial aspect of the left breast   shows no significant abnormality. Only normal-appearing breast tissue is   present.        IMPRESSION:    BI-RADS Category 3: Probable benign findings        RECOMMENDATION:    6 month mammographic follow-up of the left breast is recommended.        This facility uses a reminder system to ensure that all patients receive a   reminder letters and/or direct phone calls for appointments. This includes   reminders for routine screening mammograms, diagnostic mammograms, and other   breast interventions when appropriate. The patient will be placed in the   appropriate reminder system.            Based on ACR recommendations, your patient's lifetime risk for developing   breast cancer has been discussed with her today. Because your patient's   family history warrants further evaluation, your patient was offered the   [facility] hereditary cancer testing to help determine potential risks. You can    expect results and further communications to come from our Nurse Navigator   within the next 30 days or once the results have been communicated to the   patient. As part of this service, the Nurse Navigator or Medical Director   will be discussing results and management plan with the patient directly.   Please call our Navigator with any questions.        This document was created using a voice recognition transcribing system.   Incorrect words or phrases may have been missed during proof reading. Please   interpret accordingly or contact the radiologist for clarification if   necessary.        DICTATING PHYSICIAN:  MNINA HUBBARD MD                   Date Dictated: 07/19/21 1234        Signed By:  [place] HUBBARD MD <Electronically signed by MINNA HUBBARD MD in OV>    Date Signed:  07/19/21 1237     CC: PRINCESS PORTER NP ; PRINCESS PORTER  NP      ADMITTING PHYSICIAN:                                                                                                    ORDERING PHY: PRINCESS PORTER NP                                                                                                                                                      ATTENDING PHY: PRINCESS PORTER NP    Patient Status:  REG CLI    Admit Service Date: 21       [facility] UNKNOWN RAD EAP                                RADIOLOGY REPORT        PT NAME: LUZ ELENA DWYER [facility]     : 1985 F 36             [address]    ACCT: [id number]                                              [facility] Rec #: [id number]                                        [id number]    [place]             Procedure: BREAST UNILAT LIMITED    REQUISITION #: [id number]      REPORT #: [id number]           DATE OF EXAM: 21    TIME OF EXAM:        FABIOLA DX BALDEV W CAD CAROL, BREAST UNILAT LIMITED        CLINICAL HISTORY:    History of a left lumpectomy in 2017 with radiation chemotherapy.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

## 2022-03-18 NOTE — LETTER
March 18, 2022        Oksana Sims MD  [address] [facility] [address]             [facility]  [address]  [place] [address]  Phone: [phone]  Fax: [phone]   Patient: Cindy Escobar   MR Number: [id number]   YOB: 1985   Date of Visit: 3/18/2022       Dear Dr. Sims:    Thank you for referring Cindy Escobar to me for evaluation. Attached you will find relevant portions of my assessment and plan of care.    If you have questions, please do not hesitate to call me. I look forward to following Cindy Escobar along with you.    Sincerely,      Monae Ag NP            CC  No Recipients    Enclosure

## 2022-03-18 NOTE — TELEPHONE ENCOUNTER
Faxed over Mammo Digital Diagnostic Bilat order to central scheduling  
I have reviewed and confirmed nurses' notes...

## 2022-04-08 DIAGNOSIS — R92.8 ABNORMAL MAMMOGRAM OF LEFT BREAST: Primary | ICD-10-CM

## 2022-09-15 ENCOUNTER — OFFICE VISIT (OUTPATIENT)
Dept: HEMATOLOGY/ONCOLOGY | Facility: CLINIC | Age: 37
End: 2022-09-15
Payer: MEDICAID

## 2022-09-15 VITALS
HEIGHT: 64 IN | BODY MASS INDEX: 24.92 KG/M2 | WEIGHT: 146 LBS | HEART RATE: 79 BPM | SYSTOLIC BLOOD PRESSURE: 104 MMHG | RESPIRATION RATE: 15 BRPM | DIASTOLIC BLOOD PRESSURE: 69 MMHG | TEMPERATURE: 98 F | OXYGEN SATURATION: 98 %

## 2022-09-15 DIAGNOSIS — Z79.811 LONG TERM (CURRENT) USE OF AROMATASE INHIBITORS: Primary | ICD-10-CM

## 2022-09-15 DIAGNOSIS — R92.8 ABNORMAL MAMMOGRAM OF LEFT BREAST: ICD-10-CM

## 2022-09-15 PROCEDURE — 99214 PR OFFICE/OUTPT VISIT, EST, LEVL IV, 30-39 MIN: ICD-10-PCS | Mod: S$GLB,,, | Performed by: NURSE PRACTITIONER

## 2022-09-15 PROCEDURE — 1159F MED LIST DOCD IN RCRD: CPT | Mod: CPTII,S$GLB,, | Performed by: NURSE PRACTITIONER

## 2022-09-15 PROCEDURE — 3008F BODY MASS INDEX DOCD: CPT | Mod: CPTII,S$GLB,, | Performed by: NURSE PRACTITIONER

## 2022-09-15 PROCEDURE — 1160F PR REVIEW ALL MEDS BY PRESCRIBER/CLIN PHARMACIST DOCUMENTED: ICD-10-PCS | Mod: CPTII,S$GLB,, | Performed by: NURSE PRACTITIONER

## 2022-09-15 PROCEDURE — 1159F PR MEDICATION LIST DOCUMENTED IN MEDICAL RECORD: ICD-10-PCS | Mod: CPTII,S$GLB,, | Performed by: NURSE PRACTITIONER

## 2022-09-15 PROCEDURE — 1160F RVW MEDS BY RX/DR IN RCRD: CPT | Mod: CPTII,S$GLB,, | Performed by: NURSE PRACTITIONER

## 2022-09-15 PROCEDURE — 3074F PR MOST RECENT SYSTOLIC BLOOD PRESSURE < 130 MM HG: ICD-10-PCS | Mod: CPTII,S$GLB,, | Performed by: NURSE PRACTITIONER

## 2022-09-15 PROCEDURE — 3008F PR BODY MASS INDEX (BMI) DOCUMENTED: ICD-10-PCS | Mod: CPTII,S$GLB,, | Performed by: NURSE PRACTITIONER

## 2022-09-15 PROCEDURE — 3078F PR MOST RECENT DIASTOLIC BLOOD PRESSURE < 80 MM HG: ICD-10-PCS | Mod: CPTII,S$GLB,, | Performed by: NURSE PRACTITIONER

## 2022-09-15 PROCEDURE — 99214 OFFICE O/P EST MOD 30 MIN: CPT | Mod: S$GLB,,, | Performed by: NURSE PRACTITIONER

## 2022-09-15 PROCEDURE — 3074F SYST BP LT 130 MM HG: CPT | Mod: CPTII,S$GLB,, | Performed by: NURSE PRACTITIONER

## 2022-09-15 PROCEDURE — 3078F DIAST BP <80 MM HG: CPT | Mod: CPTII,S$GLB,, | Performed by: NURSE PRACTITIONER

## 2022-09-15 RX ORDER — LETROZOLE 2.5 MG/1
2.5 TABLET, FILM COATED ORAL DAILY
Qty: 30 TABLET | Refills: 2 | Status: SHIPPED | OUTPATIENT
Start: 2022-09-15 | End: 2023-09-15
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
## 2022-10-13 ENCOUNTER — TELEPHONE (OUTPATIENT)
Dept: HEMATOLOGY/ONCOLOGY | Facility: CLINIC | Age: 37
End: 2022-10-13
Payer: MEDICAID

## 2022-10-13 DIAGNOSIS — C50.412 MALIGNANT NEOPLASM OF UPPER-OUTER QUADRANT OF LEFT BREAST IN FEMALE, ESTROGEN RECEPTOR POSITIVE: Primary | ICD-10-CM

## 2022-10-13 DIAGNOSIS — Z17.0 MALIGNANT NEOPLASM OF UPPER-OUTER QUADRANT OF LEFT BREAST IN FEMALE, ESTROGEN RECEPTOR POSITIVE: Primary | ICD-10-CM

## 2022-10-17 LAB
ALBUMIN SERPL BCP-MCNC: 4.2 G/DL (ref 3.4–5)
ALBUMIN/GLOBULIN RATIO: 1.1 RATIO (ref 1.1–1.8)
ALP SERPL-CCNC: 68 U/L (ref 46–116)
ALT SERPL W P-5'-P-CCNC: 21 U/L (ref 12–78)
ANION GAP SERPL CALC-SCNC: 4 MMOL/L (ref 3–11)
AST SERPL-CCNC: 22 U/L (ref 15–37)
BANDS: 1 % (ref 0–5)
BILIRUB SERPL-MCNC: 0.8 MG/DL (ref 0–1)
BUN SERPL-MCNC: 24 MG/DL (ref 7–18)
BUN/CREAT SERPL: 21.23 RATIO (ref 7–18)
CALCIUM SERPL-MCNC: 9.3 MG/DL (ref 8.8–10.5)
CELLS COUNTED: 100
CHLORIDE SERPL-SCNC: 105 MMOL/L (ref 100–108)
CO2 SERPL-SCNC: 31 MMOL/L (ref 21–32)
CREAT SERPL-MCNC: 1.13 MG/DL (ref 0.55–1.02)
ERYTHROCYTE [DISTWIDTH] IN BLOOD BY AUTOMATED COUNT: 12 % (ref 12.5–18)
GFR ESTIMATION: > 60
GLOBULIN: 3.8 G/DL (ref 2.3–3.5)
GLUCOSE SERPL-MCNC: 98 MG/DL (ref 70–110)
HCT VFR BLD AUTO: 39.7 % (ref 37–47)
HGB BLD-MCNC: 13.4 G/DL (ref 12–16)
LYMPHOCYTES NFR BLD: 31 % (ref 25–40)
MCH RBC QN AUTO: 30.8 PG (ref 27–31.2)
MCHC RBC AUTO-ENTMCNC: 33.8 G/DL (ref 31.8–35.4)
MCV RBC AUTO: 91.3 FL (ref 80–97)
MONOCYTES NFR BLD: 8 % (ref 1–15)
NEUTROPHILS # BLD AUTO: 2 10*3/UL (ref 1.8–7.7)
NEUTROPHILS NFR BLD: 60 % (ref 37–80)
NUCLEATED RED BLOOD CELLS: 0 %
PLATELETS: 223 10*3/UL (ref 142–424)
POTASSIUM SERPL-SCNC: 5.4 MMOL/L (ref 3.6–5.2)
PROT SERPL-MCNC: 8 G/DL (ref 6.4–8.2)
RBC # BLD AUTO: 4.35 10*6/UL (ref 4.2–5.4)
RBC MORPH BLD: NORMAL
SMALL PLATELETS BLD QL SMEAR: ADEQUATE
SODIUM BLD-SCNC: 140 MMOL/L (ref 135–145)
WBC # BLD: 3.2 10*3/UL (ref 4.6–10.2)

## 2022-10-18 ENCOUNTER — OFFICE VISIT (OUTPATIENT)
Dept: HEMATOLOGY/ONCOLOGY | Facility: CLINIC | Age: 37
End: 2022-10-18
Payer: MEDICAID

## 2022-10-18 VITALS
DIASTOLIC BLOOD PRESSURE: 79 MMHG | HEART RATE: 83 BPM | BODY MASS INDEX: 25.58 KG/M2 | SYSTOLIC BLOOD PRESSURE: 123 MMHG | RESPIRATION RATE: 18 BRPM | WEIGHT: 149 LBS

## 2022-10-18 DIAGNOSIS — Z17.0 MALIGNANT NEOPLASM OF UPPER-OUTER QUADRANT OF LEFT BREAST IN FEMALE, ESTROGEN RECEPTOR POSITIVE: Primary | ICD-10-CM

## 2022-10-18 DIAGNOSIS — Z79.811 LONG TERM (CURRENT) USE OF AROMATASE INHIBITORS: ICD-10-CM

## 2022-10-18 DIAGNOSIS — C50.412 MALIGNANT NEOPLASM OF UPPER-OUTER QUADRANT OF LEFT BREAST IN FEMALE, ESTROGEN RECEPTOR POSITIVE: Primary | ICD-10-CM

## 2022-10-18 PROCEDURE — 3078F PR MOST RECENT DIASTOLIC BLOOD PRESSURE < 80 MM HG: ICD-10-PCS | Mod: CPTII,S$GLB,, | Performed by: NURSE PRACTITIONER

## 2022-10-18 PROCEDURE — 99214 PR OFFICE/OUTPT VISIT, EST, LEVL IV, 30-39 MIN: ICD-10-PCS | Mod: S$GLB,,, | Performed by: NURSE PRACTITIONER

## 2022-10-18 PROCEDURE — 1160F PR REVIEW ALL MEDS BY PRESCRIBER/CLIN PHARMACIST DOCUMENTED: ICD-10-PCS | Mod: CPTII,S$GLB,, | Performed by: NURSE PRACTITIONER

## 2022-10-18 PROCEDURE — 3078F DIAST BP <80 MM HG: CPT | Mod: CPTII,S$GLB,, | Performed by: NURSE PRACTITIONER

## 2022-10-18 PROCEDURE — 1160F RVW MEDS BY RX/DR IN RCRD: CPT | Mod: CPTII,S$GLB,, | Performed by: NURSE PRACTITIONER

## 2022-10-18 PROCEDURE — 3074F SYST BP LT 130 MM HG: CPT | Mod: CPTII,S$GLB,, | Performed by: NURSE PRACTITIONER

## 2022-10-18 PROCEDURE — 1159F PR MEDICATION LIST DOCUMENTED IN MEDICAL RECORD: ICD-10-PCS | Mod: CPTII,S$GLB,, | Performed by: NURSE PRACTITIONER

## 2022-10-18 PROCEDURE — 99214 OFFICE O/P EST MOD 30 MIN: CPT | Mod: S$GLB,,, | Performed by: NURSE PRACTITIONER

## 2022-10-18 PROCEDURE — 1159F MED LIST DOCD IN RCRD: CPT | Mod: CPTII,S$GLB,, | Performed by: NURSE PRACTITIONER

## 2022-10-18 PROCEDURE — 3074F PR MOST RECENT SYSTOLIC BLOOD PRESSURE < 130 MM HG: ICD-10-PCS | Mod: CPTII,S$GLB,, | Performed by: NURSE PRACTITIONER
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
## 2023-04-14 ENCOUNTER — TELEPHONE (OUTPATIENT)
Dept: HEMATOLOGY/ONCOLOGY | Facility: CLINIC | Age: 38
End: 2023-04-14
Payer: MEDICAID

## 2023-04-14 NOTE — TELEPHONE ENCOUNTER
Tried calling patient to see if mammo was done. Left voicemail, will cancel appt for next week until mammo is done.       [facility] mamm[facility] order to [facility]

## 2023-04-19 ENCOUNTER — TELEPHONE (OUTPATIENT)
Dept: HEMATOLOGY/ONCOLOGY | Facility: CLINIC | Age: 38
End: 2023-04-19
Payer: MEDICAID

## 2024-01-25 ENCOUNTER — TELEPHONE (OUTPATIENT)
Dept: HEMATOLOGY/ONCOLOGY | Facility: CLINIC | Age: 39
End: 2024-01-25
Payer: MEDICAID

## 2024-01-25 DIAGNOSIS — Z85.3 HISTORY OF BREAST CANCER: Primary | ICD-10-CM

## 2024-01-25 NOTE — TELEPHONE ENCOUNTER
Patients mammogram has been faxed and patient has been called to schedule.      ----- Message from Monae Ag NP sent at 1/25/2024 12:27 PM CST -----  Order placed    ----- Message -----  From: Vivienne Preston MA  Sent: 1/25/2024  11:18 AM CST  To: Monae Ag NP    Can you order patients mammogram? Patient is requesting.  ----- Message -----  From: Kailey Negrete  Sent: 1/25/2024  11:13 AM CST  To: [facility]cel[facility]icon[facility] Staff    Patient is still waiting for orders to be sent for mamogram please call her back at [phone] (home)               Thanks  almita

## 2024-01-29 ENCOUNTER — TELEPHONE (OUTPATIENT)
Dept: HEMATOLOGY/ONCOLOGY | Facility: CLINIC | Age: 39
End: 2024-01-29
Payer: MEDICAID

## 2024-03-19 ENCOUNTER — DOCUMENTATION ONLY (OUTPATIENT)
Dept: OBSTETRICS AND GYNECOLOGY | Facility: CLINIC | Age: 39
End: 2024-03-19
Payer: MEDICAID

## 2024-04-09 ENCOUNTER — OFFICE VISIT (OUTPATIENT)
Dept: HEMATOLOGY/ONCOLOGY | Facility: CLINIC | Age: 39
End: 2024-04-09
Payer: MEDICAID

## 2024-04-09 VITALS
HEIGHT: 64 IN | DIASTOLIC BLOOD PRESSURE: 69 MMHG | BODY MASS INDEX: 25.33 KG/M2 | HEART RATE: 72 BPM | WEIGHT: 148.38 LBS | SYSTOLIC BLOOD PRESSURE: 107 MMHG | RESPIRATION RATE: 18 BRPM

## 2024-04-09 DIAGNOSIS — Z85.3 HISTORY OF BREAST CANCER: Primary | ICD-10-CM

## 2024-04-09 PROCEDURE — 3074F SYST BP LT 130 MM HG: CPT | Mod: CPTII,S$GLB,, | Performed by: NURSE PRACTITIONER

## 2024-04-09 PROCEDURE — 99214 OFFICE O/P EST MOD 30 MIN: CPT | Mod: S$GLB,,, | Performed by: NURSE PRACTITIONER

## 2024-04-09 PROCEDURE — 3078F DIAST BP <80 MM HG: CPT | Mod: CPTII,S$GLB,, | Performed by: NURSE PRACTITIONER

## 2024-04-09 PROCEDURE — 3008F BODY MASS INDEX DOCD: CPT | Mod: CPTII,S$GLB,, | Performed by: NURSE PRACTITIONER
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
